# Patient Record
Sex: MALE | Race: OTHER | HISPANIC OR LATINO | URBAN - METROPOLITAN AREA
[De-identification: names, ages, dates, MRNs, and addresses within clinical notes are randomized per-mention and may not be internally consistent; named-entity substitution may affect disease eponyms.]

---

## 2022-06-18 ENCOUNTER — INPATIENT (INPATIENT)
Facility: HOSPITAL | Age: 32
LOS: 1 days | Discharge: HOME | DRG: 918 | End: 2022-06-20
Admitting: INTERNAL MEDICINE
Payer: COMMERCIAL

## 2022-06-18 VITALS
TEMPERATURE: 99 F | DIASTOLIC BLOOD PRESSURE: 85 MMHG | WEIGHT: 165.35 LBS | SYSTOLIC BLOOD PRESSURE: 133 MMHG | OXYGEN SATURATION: 96 % | HEART RATE: 108 BPM | RESPIRATION RATE: 15 BRPM | HEIGHT: 72 IN

## 2022-06-18 DIAGNOSIS — D64.9 ANEMIA, UNSPECIFIED: ICD-10-CM

## 2022-06-18 DIAGNOSIS — Z29.9 ENCOUNTER FOR PROPHYLACTIC MEASURES, UNSPECIFIED: ICD-10-CM

## 2022-06-18 DIAGNOSIS — R45.851 SUICIDAL IDEATIONS: ICD-10-CM

## 2022-06-18 DIAGNOSIS — T50.901A POISONING BY UNSPECIFIED DRUGS, MEDICAMENTS AND BIOLOGICAL SUBSTANCES, ACCIDENTAL (UNINTENTIONAL), INITIAL ENCOUNTER: ICD-10-CM

## 2022-06-18 DIAGNOSIS — F32.9 MAJOR DEPRESSIVE DISORDER, SINGLE EPISODE, UNSPECIFIED: ICD-10-CM

## 2022-06-18 LAB
ALBUMIN SERPL ELPH-MCNC: 4.4 G/DL — SIGNIFICANT CHANGE UP (ref 3.3–5)
ALBUMIN SERPL ELPH-MCNC: 4.5 G/DL — SIGNIFICANT CHANGE UP (ref 3.3–5)
ALP SERPL-CCNC: 55 U/L — SIGNIFICANT CHANGE UP (ref 40–120)
ALP SERPL-CCNC: 56 U/L — SIGNIFICANT CHANGE UP (ref 40–120)
ALT FLD-CCNC: 26 U/L — SIGNIFICANT CHANGE UP (ref 10–45)
ALT FLD-CCNC: 28 U/L — SIGNIFICANT CHANGE UP (ref 10–45)
ANION GAP SERPL CALC-SCNC: 12 MMOL/L — SIGNIFICANT CHANGE UP (ref 5–17)
ANION GAP SERPL CALC-SCNC: 14 MMOL/L — SIGNIFICANT CHANGE UP (ref 5–17)
APAP SERPL-MCNC: 10 UG/ML — SIGNIFICANT CHANGE UP (ref 10–30)
APTT BLD: 28.7 SEC — SIGNIFICANT CHANGE UP (ref 27.5–35.5)
AST SERPL-CCNC: 41 U/L — HIGH (ref 10–40)
AST SERPL-CCNC: 43 U/L — HIGH (ref 10–40)
BASE EXCESS BLDV CALC-SCNC: -1.1 MMOL/L — SIGNIFICANT CHANGE UP (ref -2–3)
BASOPHILS # BLD AUTO: 0.05 K/UL — SIGNIFICANT CHANGE UP (ref 0–0.2)
BASOPHILS NFR BLD AUTO: 0.7 % — SIGNIFICANT CHANGE UP (ref 0–2)
BILIRUB DIRECT SERPL-MCNC: 0.2 MG/DL — SIGNIFICANT CHANGE UP (ref 0–0.3)
BILIRUB INDIRECT FLD-MCNC: 0.3 MG/DL — SIGNIFICANT CHANGE UP (ref 0.2–1)
BILIRUB SERPL-MCNC: 0.5 MG/DL — SIGNIFICANT CHANGE UP (ref 0.2–1.2)
BILIRUB SERPL-MCNC: 0.7 MG/DL — SIGNIFICANT CHANGE UP (ref 0.2–1.2)
BUN SERPL-MCNC: 10 MG/DL — SIGNIFICANT CHANGE UP (ref 7–23)
BUN SERPL-MCNC: 12 MG/DL — SIGNIFICANT CHANGE UP (ref 7–23)
CA-I SERPL-SCNC: 1.23 MMOL/L — SIGNIFICANT CHANGE UP (ref 1.15–1.33)
CALCIUM SERPL-MCNC: 9 MG/DL — SIGNIFICANT CHANGE UP (ref 8.4–10.5)
CALCIUM SERPL-MCNC: 9.5 MG/DL — SIGNIFICANT CHANGE UP (ref 8.4–10.5)
CHLORIDE SERPL-SCNC: 106 MMOL/L — SIGNIFICANT CHANGE UP (ref 96–108)
CHLORIDE SERPL-SCNC: 108 MMOL/L — SIGNIFICANT CHANGE UP (ref 96–108)
CO2 BLDV-SCNC: 24.7 MMOL/L — SIGNIFICANT CHANGE UP (ref 22–26)
CO2 SERPL-SCNC: 21 MMOL/L — LOW (ref 22–31)
CO2 SERPL-SCNC: 21 MMOL/L — LOW (ref 22–31)
CREAT SERPL-MCNC: 0.72 MG/DL — SIGNIFICANT CHANGE UP (ref 0.5–1.3)
CREAT SERPL-MCNC: 0.79 MG/DL — SIGNIFICANT CHANGE UP (ref 0.5–1.3)
EGFR: 123 ML/MIN/1.73M2 — SIGNIFICANT CHANGE UP
EGFR: 126 ML/MIN/1.73M2 — SIGNIFICANT CHANGE UP
EOSINOPHIL # BLD AUTO: 0.18 K/UL — SIGNIFICANT CHANGE UP (ref 0–0.5)
EOSINOPHIL NFR BLD AUTO: 2.6 % — SIGNIFICANT CHANGE UP (ref 0–6)
ETHANOL SERPL-MCNC: <10 MG/DL — SIGNIFICANT CHANGE UP (ref 0–10)
GAS PNL BLDV: 139 MMOL/L — SIGNIFICANT CHANGE UP (ref 136–145)
GAS PNL BLDV: SIGNIFICANT CHANGE UP
GLUCOSE SERPL-MCNC: 102 MG/DL — HIGH (ref 70–99)
GLUCOSE SERPL-MCNC: 117 MG/DL — HIGH (ref 70–99)
HCO3 BLDV-SCNC: 24 MMOL/L — SIGNIFICANT CHANGE UP (ref 22–29)
HCT VFR BLD CALC: 36.7 % — LOW (ref 39–50)
HGB BLD-MCNC: 12.6 G/DL — LOW (ref 13–17)
IMM GRANULOCYTES NFR BLD AUTO: 0.3 % — SIGNIFICANT CHANGE UP (ref 0–1.5)
INR BLD: 0.98 — SIGNIFICANT CHANGE UP (ref 0.88–1.16)
LACTATE SERPL-SCNC: 0.7 MMOL/L — SIGNIFICANT CHANGE UP (ref 0.5–2)
LYMPHOCYTES # BLD AUTO: 1.73 K/UL — SIGNIFICANT CHANGE UP (ref 1–3.3)
LYMPHOCYTES # BLD AUTO: 25.1 % — SIGNIFICANT CHANGE UP (ref 13–44)
MAGNESIUM SERPL-MCNC: 2 MG/DL — SIGNIFICANT CHANGE UP (ref 1.6–2.6)
MCHC RBC-ENTMCNC: 33.1 PG — SIGNIFICANT CHANGE UP (ref 27–34)
MCHC RBC-ENTMCNC: 34.3 GM/DL — SIGNIFICANT CHANGE UP (ref 32–36)
MCV RBC AUTO: 96.3 FL — SIGNIFICANT CHANGE UP (ref 80–100)
MONOCYTES # BLD AUTO: 0.56 K/UL — SIGNIFICANT CHANGE UP (ref 0–0.9)
MONOCYTES NFR BLD AUTO: 8.1 % — SIGNIFICANT CHANGE UP (ref 2–14)
NEUTROPHILS # BLD AUTO: 4.35 K/UL — SIGNIFICANT CHANGE UP (ref 1.8–7.4)
NEUTROPHILS NFR BLD AUTO: 63.2 % — SIGNIFICANT CHANGE UP (ref 43–77)
NRBC # BLD: 0 /100 WBCS — SIGNIFICANT CHANGE UP (ref 0–0)
PCO2 BLDV: 38 MMHG — LOW (ref 42–55)
PH BLDV: 7.4 — SIGNIFICANT CHANGE UP (ref 7.32–7.43)
PLATELET # BLD AUTO: 278 K/UL — SIGNIFICANT CHANGE UP (ref 150–400)
PO2 BLDV: 91 MMHG — HIGH (ref 25–45)
POTASSIUM BLDV-SCNC: 3.5 MMOL/L — SIGNIFICANT CHANGE UP (ref 3.5–5.1)
POTASSIUM SERPL-MCNC: 3.6 MMOL/L — SIGNIFICANT CHANGE UP (ref 3.5–5.3)
POTASSIUM SERPL-MCNC: 4.2 MMOL/L — SIGNIFICANT CHANGE UP (ref 3.5–5.3)
POTASSIUM SERPL-SCNC: 3.6 MMOL/L — SIGNIFICANT CHANGE UP (ref 3.5–5.3)
POTASSIUM SERPL-SCNC: 4.2 MMOL/L — SIGNIFICANT CHANGE UP (ref 3.5–5.3)
PROT SERPL-MCNC: 6.9 G/DL — SIGNIFICANT CHANGE UP (ref 6–8.3)
PROT SERPL-MCNC: 7 G/DL — SIGNIFICANT CHANGE UP (ref 6–8.3)
PROTHROM AB SERPL-ACNC: 11.6 SEC — SIGNIFICANT CHANGE UP (ref 10.5–13.4)
RBC # BLD: 3.81 M/UL — LOW (ref 4.2–5.8)
RBC # FLD: 11.8 % — SIGNIFICANT CHANGE UP (ref 10.3–14.5)
SALICYLATES SERPL-MCNC: <0.3 MG/DL — LOW (ref 2.8–20)
SAO2 % BLDV: 97 % — HIGH (ref 67–88)
SARS-COV-2 RNA SPEC QL NAA+PROBE: NEGATIVE — SIGNIFICANT CHANGE UP
SODIUM SERPL-SCNC: 141 MMOL/L — SIGNIFICANT CHANGE UP (ref 135–145)
SODIUM SERPL-SCNC: 141 MMOL/L — SIGNIFICANT CHANGE UP (ref 135–145)
WBC # BLD: 6.89 K/UL — SIGNIFICANT CHANGE UP (ref 3.8–10.5)
WBC # FLD AUTO: 6.89 K/UL — SIGNIFICANT CHANGE UP (ref 3.8–10.5)

## 2022-06-18 PROCEDURE — 99291 CRITICAL CARE FIRST HOUR: CPT

## 2022-06-18 PROCEDURE — 93010 ELECTROCARDIOGRAM REPORT: CPT

## 2022-06-18 PROCEDURE — 71045 X-RAY EXAM CHEST 1 VIEW: CPT | Mod: 26

## 2022-06-18 PROCEDURE — 99223 1ST HOSP IP/OBS HIGH 75: CPT | Mod: GC

## 2022-06-18 RX ORDER — ONDANSETRON 8 MG/1
4 TABLET, FILM COATED ORAL ONCE
Refills: 0 | Status: COMPLETED | OUTPATIENT
Start: 2022-06-18 | End: 2022-06-18

## 2022-06-18 RX ORDER — SODIUM CHLORIDE 9 MG/ML
2000 INJECTION INTRAMUSCULAR; INTRAVENOUS; SUBCUTANEOUS ONCE
Refills: 0 | Status: COMPLETED | OUTPATIENT
Start: 2022-06-18 | End: 2022-06-18

## 2022-06-18 RX ORDER — ACTIVATED CHARCOAL 208 MG/ML
50 SUSPENSION ORAL ONCE
Refills: 0 | Status: COMPLETED | OUTPATIENT
Start: 2022-06-18 | End: 2022-06-18

## 2022-06-18 RX ORDER — POTASSIUM CHLORIDE 20 MEQ
40 PACKET (EA) ORAL ONCE
Refills: 0 | Status: COMPLETED | OUTPATIENT
Start: 2022-06-18 | End: 2022-06-19

## 2022-06-18 RX ADMIN — SODIUM CHLORIDE 4000 MILLILITER(S): 9 INJECTION INTRAMUSCULAR; INTRAVENOUS; SUBCUTANEOUS at 18:59

## 2022-06-18 RX ADMIN — ONDANSETRON 4 MILLIGRAM(S): 8 TABLET, FILM COATED ORAL at 19:30

## 2022-06-18 RX ADMIN — ACTIVATED CHARCOAL 50 GRAM(S): 208 SUSPENSION ORAL at 19:23

## 2022-06-18 NOTE — CONSULT NOTE ADULT - SUBJECTIVE AND OBJECTIVE BOX
ICU CONSULT NOTE    HPI:  31 yo PMHx of anxiety/depression presents to the ED after taking 10 tabs of sertaline 100 mg (old medication no longer actively takes) 10 tables of tyelnol 500 mg and half a bottle of wellbutrin 150 mg. Patient was recently discharged from [facility] earlier this week after ingesting alcohol, cocaine and crystal meth. Patient went home after hospitalization and took all thse medications in an attempt to end his life. Patient endorses current suicidal ideation. Denies any alcohol or other drug use today. He drinks every weekend, occasional use of crystal meth and cigarettes. No opoid use that he can recall.  Patient has no other medical history, but takes descovy at home for prep. Patient states that he does not have any psychiatric hospitalizations that he can recall. Initially had abdominal pain which now thus resolved.     In the ED:  Vitals: Temp 98.6, , /85, RR 16 O2 saturation 96   Labs: WBC 6.89, RBC 12.6, Plt 278SNa 141, K 3.6, Cl106, Bicarb 21, Anion gap 14, Cr 0.79, AST 43ABG VBG pH 7.4, Co2 38 O2 91 O2 saturation 97%  Imaging: CXR wnl  Intervention: activated charcoal, 2 L NS, zofran (18 Jun 2022 21:34)      REVIEW OF SYSTEMS:  ROS negative unless as stated above    PAST MEDICAL & SURGICAL HISTORY:  Anxiety and depression          FAMILY HISTORY:      SOCIAL HISTORY:  Smoking Status: [ ] Current, [ ] Former, [ ] Never  Pack Years:    MEDICATIONS:  Pulmonary:    Antimicrobials:    Anticoagulants:    Onc:    GI/:    Endocrine:    Cardiac:    Other Medications:  LORazepam   Injectable 2 milliGRAM(s) IV Push every 4 hours PRN      Allergies    Allergy Status Unknown    Intolerances        Vital Signs Last 24 Hrs  T(C): 37.2 (19 Jun 2022 06:26), Max: 37.2 (19 Jun 2022 06:26)  T(F): 99 (19 Jun 2022 06:26), Max: 99 (19 Jun 2022 06:26)  HR: 70 (19 Jun 2022 04:10) (70 - 108)  BP: 120/69 (19 Jun 2022 04:10) (120/69 - 153/93)  BP(mean): 88 (19 Jun 2022 04:10) (88 - 101)  RR: 16 (19 Jun 2022 04:10) (15 - 17)  SpO2: 93% (19 Jun 2022 04:10) (93% - 100%)    06-18 @ 07:01  -  06-19 @ 06:47  --------------------------------------------------------  IN: 0 mL / OUT: 600 mL / NET: -600 mL          PHYSICAL EXAM:  GENERAL: depressed affect, profusely sweating   HEAD:  Normocephalic, atraumatic  EYES: EOMI, PERRLA  HEENT: charcoal seen in mouth   NECK: Supple, No JVD  NERVOUS SYSTEM:  Alert & Oriented X3, Motor Strength 5/5 B/L upper and lower extremities  CHEST/LUNG: Clear to auscultation bilaterally  HEART: Regular rate and rhythm  ABDOMEN: Soft, non tender, Nondistended, Bowel sounds present  GENITOURINARY: Voiding, no palpable bladder  EXTREMITIES:   No clubbing, cyanosis, or edema  MUSCULOSKELETAL- No muscle tenderness, no joint tenderness  SKIN-no rash    LABS:      CBC Full  -  ( 19 Jun 2022 05:30 )  WBC Count : 5.50 K/uL  RBC Count : 3.95 M/uL  Hemoglobin : 12.9 g/dL  Hematocrit : 37.7 %  Platelet Count - Automated : 263 K/uL  Mean Cell Volume : 95.4 fl  Mean Cell Hemoglobin : 32.7 pg  Mean Cell Hemoglobin Concentration : 34.2 gm/dL  Auto Neutrophil # : 3.44 K/uL  Auto Lymphocyte # : 1.40 K/uL  Auto Monocyte # : 0.49 K/uL  Auto Eosinophil # : 0.11 K/uL  Auto Basophil # : 0.05 K/uL  Auto Neutrophil % : 62.5 %  Auto Lymphocyte % : 25.5 %  Auto Monocyte % : 8.9 %  Auto Eosinophil % : 2.0 %  Auto Basophil % : 0.9 %    06-19    135  |  103  |  7   ----------------------------<  108<H>  4.0   |  22  |  0.69    Ca    8.9      19 Jun 2022 05:30  Phos  3.2     06-19  Mg     2.0     06-19    TPro  6.8  /  Alb  4.4  /  TBili  0.6  /  DBili  x   /  AST  36  /  ALT  27  /  AlkPhos  53  06-19    PT/INR - ( 19 Jun 2022 05:30 )   PT: 12.3 sec;   INR: 1.03          PTT - ( 19 Jun 2022 05:30 )  PTT:28.1 sec                  RADIOLOGY & ADDITIONAL STUDIES:

## 2022-06-18 NOTE — H&P ADULT - NSHPPHYSICALEXAM_GEN_ALL_CORE
PHYSICAL EXAM:    GENERAL: depressed affect, profusely sweating   HEAD:  Normocephalic, atraumatic  EYES: EOMI, PERRLA  HEENT: charcoal seen in mouth   NECK: Supple, No JVD  NERVOUS SYSTEM:  Alert & Oriented X3, Motor Strength 5/5 B/L upper and lower extremities  CHEST/LUNG: Clear to auscultation bilaterally  HEART: Regular rate and rhythm  ABDOMEN: Soft, non tender, Nondistended, Bowel sounds present  GENITOURINARY: Voiding, no palpable bladder  EXTREMITIES:   No clubbing, cyanosis, or edema  MUSCULOSKELETAL- No muscle tenderness, no joint tenderness  SKIN-no rash

## 2022-06-18 NOTE — ED ADULT NURSE NOTE - OBJECTIVE STATEMENT
.  32years male near alert mental state (AOX3) received by EMS.  -complain of SI.  Hx of anxiety, depression, cocaine use. pt took Tylenol, Wellbutrin, and Sertraline around 6pm today. pt called  911. EMS was transporting him while they accessed IV on Lt arm and gave fluid.   -denied chest pain, SOB, abdomen pain.  Pt is in the bed comfortably at this time. Will continue to monitor and document any changes. .  32years male near alert mental state (AOX3) received by EMS.  -complain of SI.  Hx of anxiety, depression, cocaine use. pt took Tylenol, Wellbutrin, and Sertraline around 6pm today. pt called  911. EMS was transporting him while they accessed IV on Lt arm and gave fluid.    pt last drugs use and drinking alcohol were on last Wednesday. (3-4times drinking alcohol per week)   -denied chest pain, SOB, abdomen pain.  Pt is in the bed comfortably at this time. Will continue to monitor and document any changes.

## 2022-06-18 NOTE — ED ADULT TRIAGE NOTE - CHIEF COMPLAINT QUOTE
SI Attempt about 45 min ago. EMS reports pt took Tylenol, Wellbutrin, and Sertraline. Bottles found empty on scene. Pt called 911. Pt awake and alert upon arrival to [facility], becoming more lethargic. Hx of anxiety, depression, cocaine use as per EMS. 1:1 initiated, EKG in progress.

## 2022-06-18 NOTE — H&P ADULT - ATTENDING COMMENTS
OD on bupropion, sertraline and acetaminophen. Toxicology consulted.  tele monitoring, serial EKG to monitor QTc  agitation/tremor and possible seizure monitoring and treatment, ativan PRN on stand by  serial lfts and acetaminophen level, possible treatment with NAC OD on bupropion, sertraline and acetaminophen. Toxicology consulted.  tele monitoring, serial EKG to monitor QTc  agitation/tremor and possible seizure monitoring and treatment, ativan PRN on stand by  serial lfts and acetaminophen level, possible treatment with NAC  1:1 observation  psychiatry evaluation

## 2022-06-18 NOTE — H&P ADULT - PROBLEM SELECTOR PLAN 4
patient with hx of major depression  only on wellbutrin 100 at home  psych consulted to see pt in AM  -f/u psych recs

## 2022-06-18 NOTE — H&P ADULT - PROBLEM SELECTOR PLAN 2
patient with active   psych consulted  to see patient in the AM  continue with constant observation  -f/u psych recs

## 2022-06-18 NOTE — H&P ADULT - HISTORY OF PRESENT ILLNESS
31 yo PMHx of anxiety/depression presents to the ED after taking 10 tabs of sertaline 100 mg (old medication no longer actively takes) 10 tables of tyelnol 500 mg and half a bottle of wellbutrin 150 mg. Patient was recently discharged from [facility] earlier this week after ingesting alcohol, cocaine and crystal meth. Patient went home after hospitalization and took all thse medications in an attempt to end his life. Patient endorses current suicidal ideation. Denies any alcohol or other drug use today. He drinks every weekend, occasional use of crystal meth and cigarettes. No opoid use that he can recall.  Patient has no other medical history, but takes descovy at home for prep. Patient states that he does not have any psychiatric hospitalizations that he can recall. Initially had abdominal pain which now thus resolved.     In the ED:  Vitals: Temp 98.6, , /85, RR 16 O2 saturation 96   Labs: WBC 6.89, RBC 12.6, Plt 278SNa 141, K 3.6, Cl106, Bicarb 21, Anion gap 14, Cr 0.79, AST 43ABG VBG pH 7.4, Co2 38 O2 91 O2 saturation 97%  Imaging: CXR wnl  Intervention: activated charcoal, 2 L NS, zofran

## 2022-06-18 NOTE — H&P ADULT - PROBLEM SELECTOR PLAN 3
patient with anemia on labs at 12.7  normocytic anemia  no signs or symptoms of bleeding  -f/u AM iron studies b12 folate

## 2022-06-18 NOTE — CONSULT NOTE ADULT - ATTENDING COMMENTS
Admit to telemetry for monitoring of EKG changes/ rhythm/ seizures/mental status/ frequent blood draw.

## 2022-06-18 NOTE — ED PROVIDER NOTE - OBJECTIVE STATEMENT
33 yo PMHx of anxiety/depression presenting with SI. Hx limited by somnolence, per pt took sertraline 100mg ~10 tablets, tylenol 500mg ?tablets, bupropion XL 150mg tablets ~10 tablets 45mins prior to arrival. reports nausea and stomach cramping. Recent admission to psych [facility] per EMS, pt lives home alone. Denies etoh or other illicit drug use today. 33 yo PMHx of anxiety/depression presenting with SI. Hx limited by somnolence, per pt took sertraline 100mg ~10 tablets, tylenol 500mg ?tablets, bupropion XL 150mg tablets ~10 tablets 45mins prior to arrival. reports nausea and stomach cramping. Recent admission to [facility] per EMS, pt lives home alone. Denies etoh or other illicit drug use today. Denies falls, trauma, skin cutting.

## 2022-06-18 NOTE — ED PROVIDER NOTE - NS_EDPROVIDERDISPOUSERTYPE_ED_A_ED
I have personally performed a face to face diagnostic evaluation on this patient. I have reviewed the ACP note and agree with the history, exam and plan of care, except as noted. Attending Attestation (For Attendings USE Only)...

## 2022-06-18 NOTE — ED PROVIDER NOTE - PHYSICAL EXAMINATION
CONSTITUTIONAL: Awake, alert and in no apparent distress.  HEENT: Head is atraumatic. Eyes clear bilaterally, normal EOMI. Airway patent.  CARDIAC: Normal rate, regular rhythm.  Heart sounds S1, S2.   RESPIRATORY: Breath sounds clear and equal bilaterally. no tachypnea, respiratory distress.   GASTROINTESTINAL: Abdomen soft, non-tender, no guarding, distension.  MUSCULOSKELETAL: Spine appears normal, no midline spinal tenderness, range of motion is not limited, no muscle or joint tenderness. no bony tenderness.   NEUROLOGICAL: Alert, no focal deficits, no motor or sensory deficits.  SKIN: Skin normal color for race, warm, dry and intact. No evidence of rash.

## 2022-06-18 NOTE — H&P ADULT - PROBLEM SELECTOR PLAN 1
patient presents after 31 yo PMHx of anxiety/depression presents to the ED after taking 10 tabs of sertaline 100 mg (old medication no longer actively takes) 10 tables of tyelnol 500 mg and half a bottle of wellbutrin 150 mg admitted for further monitoring   admits to taking medication in a suicide attempt  poison control called in ED - recalled when reached 7la left message  given activated charcoal  admitted for qtc monitoring, level monitoring and seizure precaution  -q4 cmp salicyate level acetaminophen level ekgs  -ativan 2 mg prn for agitation/seizure (provider to be informed before being given)

## 2022-06-18 NOTE — H&P ADULT - ASSESSMENT
31 yo PMHx of anxiety/depression presents to the ED after taking 10 tabs of sertaline 100 mg (old medication no longer actively takes) 10 tables of tyelnol 500 mg and half a bottle of wellbutrin 150 mg admitted for further monitoring

## 2022-06-18 NOTE — CONSULT NOTE ADULT - ASSESSMENT
31 yo PMHx of anxiety/depression presents to the ED after taking 10 tabs of sertaline 100 mg (old medication no longer actively takes) 10 tables of tyelnol 500 mg and half a bottle of wellbutrin 150 mg admitted for further monitoring 33 yo PMHx of anxiety/depression presents to the ED after taking 10 tabs of sertaline 100 mg (old medication no longer actively takes) 10 tables of tyelnol 500 mg and half a bottle of wellbutrin 150 mg admitted for further monitoring.

## 2022-06-18 NOTE — ED PROVIDER NOTE - CLINICAL SUMMARY MEDICAL DECISION MAKING FREE TEXT BOX
Pt here for overdose as SI w above medications, given charcoal on arrival. Discussed with pharmacist Catrachito Washington, continue to monitor for seizures, serotonin syndrome, QRS widening.. Pt here for overdose as SI w above medications, given charcoal on arrival. Discussed with pharmacist Catrachito Washington of poison control, continue to monitor for seizures, serotonin syndrome, QRS widening, consider NAC after 4 hour acetaminophen level.

## 2022-06-18 NOTE — ED PROVIDER NOTE - INPATIENT RESIDENT/ACP NOTIFIED
Expected Date Of Service: 08/20/2021 Billing Type: Third-Party Bill ICU res Bill For Surgical Tray: no

## 2022-06-19 DIAGNOSIS — F14.20 COCAINE DEPENDENCE, UNCOMPLICATED: ICD-10-CM

## 2022-06-19 DIAGNOSIS — Z90.89 ACQUIRED ABSENCE OF OTHER ORGANS: Chronic | ICD-10-CM

## 2022-06-19 DIAGNOSIS — F19.10 OTHER PSYCHOACTIVE SUBSTANCE ABUSE, UNCOMPLICATED: ICD-10-CM

## 2022-06-19 DIAGNOSIS — F41.1 GENERALIZED ANXIETY DISORDER: ICD-10-CM

## 2022-06-19 DIAGNOSIS — F15.20 OTHER STIMULANT DEPENDENCE, UNCOMPLICATED: ICD-10-CM

## 2022-06-19 LAB
ALBUMIN SERPL ELPH-MCNC: 4.4 G/DL — SIGNIFICANT CHANGE UP (ref 3.3–5)
ALBUMIN SERPL ELPH-MCNC: 4.4 G/DL — SIGNIFICANT CHANGE UP (ref 3.3–5)
ALP SERPL-CCNC: 53 U/L — SIGNIFICANT CHANGE UP (ref 40–120)
ALP SERPL-CCNC: 56 U/L — SIGNIFICANT CHANGE UP (ref 40–120)
ALT FLD-CCNC: 27 U/L — SIGNIFICANT CHANGE UP (ref 10–45)
ALT FLD-CCNC: 27 U/L — SIGNIFICANT CHANGE UP (ref 10–45)
ANION GAP SERPL CALC-SCNC: 10 MMOL/L — SIGNIFICANT CHANGE UP (ref 5–17)
ANION GAP SERPL CALC-SCNC: 11 MMOL/L — SIGNIFICANT CHANGE UP (ref 5–17)
APAP SERPL-MCNC: <5 UG/ML — LOW (ref 10–30)
APAP SERPL-MCNC: <5 UG/ML — LOW (ref 10–30)
APTT BLD: 28.1 SEC — SIGNIFICANT CHANGE UP (ref 27.5–35.5)
AST SERPL-CCNC: 36 U/L — SIGNIFICANT CHANGE UP (ref 10–40)
AST SERPL-CCNC: 38 U/L — SIGNIFICANT CHANGE UP (ref 10–40)
BASOPHILS # BLD AUTO: 0.05 K/UL — SIGNIFICANT CHANGE UP (ref 0–0.2)
BASOPHILS NFR BLD AUTO: 0.9 % — SIGNIFICANT CHANGE UP (ref 0–2)
BILIRUB SERPL-MCNC: 0.6 MG/DL — SIGNIFICANT CHANGE UP (ref 0.2–1.2)
BILIRUB SERPL-MCNC: 0.7 MG/DL — SIGNIFICANT CHANGE UP (ref 0.2–1.2)
BUN SERPL-MCNC: 7 MG/DL — SIGNIFICANT CHANGE UP (ref 7–23)
BUN SERPL-MCNC: 8 MG/DL — SIGNIFICANT CHANGE UP (ref 7–23)
CALCIUM SERPL-MCNC: 8.9 MG/DL — SIGNIFICANT CHANGE UP (ref 8.4–10.5)
CALCIUM SERPL-MCNC: 8.9 MG/DL — SIGNIFICANT CHANGE UP (ref 8.4–10.5)
CHLORIDE SERPL-SCNC: 103 MMOL/L — SIGNIFICANT CHANGE UP (ref 96–108)
CHLORIDE SERPL-SCNC: 104 MMOL/L — SIGNIFICANT CHANGE UP (ref 96–108)
CO2 SERPL-SCNC: 20 MMOL/L — LOW (ref 22–31)
CO2 SERPL-SCNC: 22 MMOL/L — SIGNIFICANT CHANGE UP (ref 22–31)
CREAT SERPL-MCNC: 0.67 MG/DL — SIGNIFICANT CHANGE UP (ref 0.5–1.3)
CREAT SERPL-MCNC: 0.69 MG/DL — SIGNIFICANT CHANGE UP (ref 0.5–1.3)
EGFR: 128 ML/MIN/1.73M2 — SIGNIFICANT CHANGE UP
EGFR: 129 ML/MIN/1.73M2 — SIGNIFICANT CHANGE UP
EOSINOPHIL # BLD AUTO: 0.11 K/UL — SIGNIFICANT CHANGE UP (ref 0–0.5)
EOSINOPHIL NFR BLD AUTO: 2 % — SIGNIFICANT CHANGE UP (ref 0–6)
FERRITIN SERPL-MCNC: 108 NG/ML — SIGNIFICANT CHANGE UP (ref 30–400)
FOLATE SERPL-MCNC: >20 NG/ML — SIGNIFICANT CHANGE UP
GLUCOSE SERPL-MCNC: 108 MG/DL — HIGH (ref 70–99)
GLUCOSE SERPL-MCNC: 118 MG/DL — HIGH (ref 70–99)
HCT VFR BLD CALC: 37.7 % — LOW (ref 39–50)
HGB BLD-MCNC: 12.9 G/DL — LOW (ref 13–17)
IMM GRANULOCYTES NFR BLD AUTO: 0.2 % — SIGNIFICANT CHANGE UP (ref 0–1.5)
INR BLD: 1.03 — SIGNIFICANT CHANGE UP (ref 0.88–1.16)
IRON SATN MFR SERPL: 18 % — SIGNIFICANT CHANGE UP (ref 16–55)
IRON SATN MFR SERPL: 53 UG/DL — SIGNIFICANT CHANGE UP (ref 45–165)
LYMPHOCYTES # BLD AUTO: 1.4 K/UL — SIGNIFICANT CHANGE UP (ref 1–3.3)
LYMPHOCYTES # BLD AUTO: 25.5 % — SIGNIFICANT CHANGE UP (ref 13–44)
MAGNESIUM SERPL-MCNC: 2 MG/DL — SIGNIFICANT CHANGE UP (ref 1.6–2.6)
MCHC RBC-ENTMCNC: 32.7 PG — SIGNIFICANT CHANGE UP (ref 27–34)
MCHC RBC-ENTMCNC: 34.2 GM/DL — SIGNIFICANT CHANGE UP (ref 32–36)
MCV RBC AUTO: 95.4 FL — SIGNIFICANT CHANGE UP (ref 80–100)
MONOCYTES # BLD AUTO: 0.49 K/UL — SIGNIFICANT CHANGE UP (ref 0–0.9)
MONOCYTES NFR BLD AUTO: 8.9 % — SIGNIFICANT CHANGE UP (ref 2–14)
NEUTROPHILS # BLD AUTO: 3.44 K/UL — SIGNIFICANT CHANGE UP (ref 1.8–7.4)
NEUTROPHILS NFR BLD AUTO: 62.5 % — SIGNIFICANT CHANGE UP (ref 43–77)
NRBC # BLD: 0 /100 WBCS — SIGNIFICANT CHANGE UP (ref 0–0)
PHOSPHATE SERPL-MCNC: 3.2 MG/DL — SIGNIFICANT CHANGE UP (ref 2.5–4.5)
PLATELET # BLD AUTO: 263 K/UL — SIGNIFICANT CHANGE UP (ref 150–400)
POTASSIUM SERPL-MCNC: 4 MMOL/L — SIGNIFICANT CHANGE UP (ref 3.5–5.3)
POTASSIUM SERPL-MCNC: 4 MMOL/L — SIGNIFICANT CHANGE UP (ref 3.5–5.3)
POTASSIUM SERPL-SCNC: 4 MMOL/L — SIGNIFICANT CHANGE UP (ref 3.5–5.3)
POTASSIUM SERPL-SCNC: 4 MMOL/L — SIGNIFICANT CHANGE UP (ref 3.5–5.3)
PROT SERPL-MCNC: 6.8 G/DL — SIGNIFICANT CHANGE UP (ref 6–8.3)
PROT SERPL-MCNC: 7.1 G/DL — SIGNIFICANT CHANGE UP (ref 6–8.3)
PROTHROM AB SERPL-ACNC: 12.3 SEC — SIGNIFICANT CHANGE UP (ref 10.5–13.4)
RBC # BLD: 3.95 M/UL — LOW (ref 4.2–5.8)
RBC # FLD: 11.9 % — SIGNIFICANT CHANGE UP (ref 10.3–14.5)
SALICYLATES SERPL-MCNC: <0.3 MG/DL — LOW (ref 2.8–20)
SALICYLATES SERPL-MCNC: <0.3 MG/DL — LOW (ref 2.8–20)
SODIUM SERPL-SCNC: 135 MMOL/L — SIGNIFICANT CHANGE UP (ref 135–145)
SODIUM SERPL-SCNC: 135 MMOL/L — SIGNIFICANT CHANGE UP (ref 135–145)
TIBC SERPL-MCNC: 302 UG/DL — SIGNIFICANT CHANGE UP (ref 220–430)
TRANSFERRIN SERPL-MCNC: 251 MG/DL — SIGNIFICANT CHANGE UP (ref 200–360)
UIBC SERPL-MCNC: 249 UG/DL — SIGNIFICANT CHANGE UP (ref 110–370)
VIT B12 SERPL-MCNC: 530 PG/ML — SIGNIFICANT CHANGE UP (ref 232–1245)
WBC # BLD: 5.5 K/UL — SIGNIFICANT CHANGE UP (ref 3.8–10.5)
WBC # FLD AUTO: 5.5 K/UL — SIGNIFICANT CHANGE UP (ref 3.8–10.5)

## 2022-06-19 PROCEDURE — 99233 SBSQ HOSP IP/OBS HIGH 50: CPT | Mod: GC

## 2022-06-19 RX ORDER — IBUPROFEN 200 MG
200 TABLET ORAL ONCE
Refills: 0 | Status: COMPLETED | OUTPATIENT
Start: 2022-06-19 | End: 2022-06-19

## 2022-06-19 RX ADMIN — Medication 200 MILLIGRAM(S): at 18:23

## 2022-06-19 RX ADMIN — Medication 200 MILLIGRAM(S): at 20:02

## 2022-06-19 RX ADMIN — Medication 40 MILLIEQUIVALENT(S): at 00:20

## 2022-06-19 NOTE — BH CONSULTATION LIAISON ASSESSMENT NOTE - NSBHCONSULTMEDANXIETY_PSY_A_CORE FT
- can give hydroxyzine (Atarax) 25 mg PO TID PRN for anxiety; patient currently declining initiation of benzodiazepines for anxiety
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
## 2022-06-19 NOTE — BH CONSULTATION LIAISON ASSESSMENT NOTE - NSBHSAAMPHET_PSY_A_CORE FT
The patient reports methamphetamine use every weekend since Thanksgiving 2021.  Last use on 06/16/22

## 2022-06-19 NOTE — BH CONSULTATION LIAISON ASSESSMENT NOTE - RISK ASSESSMENT
The patient is at chronic elevated risk for self-harm/suicide given a history of one suicide attempt (June 2022), diagnoses of depression and anxiety, ongoing substance use (cocaine, methamphetamines, alcohol, cannabis), male gender, and history of trauma which is acutely elevated given acute substance use and suicide attempt via intentional overdose.  Protective factors that mitigate the patient's risk include a capacity to advocate for self, future-oriented, employment, cultural/spiritual beliefs against suicide, responsibility to family members, positive therapeutic relationship.
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
## 2022-06-19 NOTE — BH CONSULTATION LIAISON ASSESSMENT NOTE - CURRENT MEDICATION
MEDICATIONS  (STANDING):    MEDICATIONS  (PRN):  LORazepam   Injectable 2 milliGRAM(s) IV Push every 4 hours PRN Anxiety and agitation

## 2022-06-19 NOTE — PATIENT PROFILE ADULT - FALL HARM RISK - HARM RISK INTERVENTIONS

## 2022-06-19 NOTE — BH CONSULTATION LIAISON ASSESSMENT NOTE - NSBHSAALC_PSY_A_CORE FT
The patient reports consuming one bottle of Pastis every weekend as well as multiple drinks when out with friends

## 2022-06-19 NOTE — BH CONSULTATION LIAISON ASSESSMENT NOTE - NSICDXBHSECONDARYDX_PSY_ALL_CORE
Suicidal ideation   R45.851  Generalized anxiety disorder   F41.1  Polysubstance abuse   F19.10  Severe cocaine use disorder   F14.20  Methamphetamine use disorder, severe   F15.20

## 2022-06-19 NOTE — BH CONSULTATION LIAISON ASSESSMENT NOTE - NSUNABLEASSESSPROTRISKCOMMENT_PSY_ALL_CORE
Protective factors that mitigate the patient's risk include a capacity to advocate for self, future-oriented, employment, cultural/spiritual beliefs against suicide, responsibility to family members, positive therapeutic relationship.

## 2022-06-19 NOTE — BH CONSULTATION LIAISON ASSESSMENT NOTE - NSACTIVEVENT_PSY_ALL_CORE
Triggering events leading to humiliation, shame, and/or despair (e.g., Loss of relationship, financial or health status) (real or anticipated)/Current or pending social isolation/Substance intoxication or withdrawal/Inadequate social supports

## 2022-06-19 NOTE — BH CONSULTATION LIAISON ASSESSMENT NOTE - DESCRIPTION
The patient was born and raised in [place], [place] by his biological mother and father and older sister.  The patient's sister is 5 years his senior and currently resides in [place], [place].  The patient is domiciled in a private [facility] apartment.  The patient graduated from the [employer] in 2014.  He is currently an  for [employer].  He denies a history of  service.  He identifies as a cisgender male and is homosexual.  The patient states that he is half [place]-American.  He was raised Shinto but has been considering Atheism.

## 2022-06-19 NOTE — BH CONSULTATION LIAISON ASSESSMENT NOTE - NSCOMMENTSUICRISKFACT_PSY_ALL_CORE
The patient has a chronic elevated risk for self-harm/suicide given a history of one suicide attempt (June 2022), diagnoses of depression and anxiety, ongoing substance use (cocaine, methamphetamines, alcohol, cannabis), male gender, and history of trauma.

## 2022-06-19 NOTE — BH CONSULTATION LIAISON ASSESSMENT NOTE - NSBHSACONSEQUENCE_PSY_A_CORE FT
CPEP psychiatric evaluation.  Suicide attempt via intentional overdose leading to inpatient psychiatric hospitalization

## 2022-06-19 NOTE — BH CONSULTATION LIAISON ASSESSMENT NOTE - NSBHATTESTSEENBY_PSY_A_CORE
yes/need take antibiotic before dental procedure Trainee with telephonic supervision from Attending Psychiatrist

## 2022-06-19 NOTE — BH CONSULTATION LIAISON ASSESSMENT NOTE - NSBHCHARTREVIEWVS_PSY_A_CORE FT
Vital Signs Last 24 Hrs  T(C): 36.5 (19 Jun 2022 17:58), Max: 37.2 (19 Jun 2022 06:26)  T(F): 97.7 (19 Jun 2022 17:58), Max: 99 (19 Jun 2022 06:26)  HR: 88 (19 Jun 2022 16:20) (70 - 92)  BP: 138/74 (19 Jun 2022 16:20) (120/69 - 153/93)  BP(mean): 99 (19 Jun 2022 16:20) (88 - 101)  RR: 16 (19 Jun 2022 16:20) (16 - 17)  SpO2: 96% (19 Jun 2022 16:20) (93% - 100%)

## 2022-06-19 NOTE — BH CONSULTATION LIAISON ASSESSMENT NOTE - NSSUICPROTFACT_PSY_ALL_CORE
Responsibility to children, family, or others/Identifies reasons for living/Fear of death or the actual act of killing self/Cultural, spiritual and/or moral attitudes against suicide/Engaged in work or school/Positive therapeutic relationships/Rastafarian beliefs
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
## 2022-06-19 NOTE — PROGRESS NOTE ADULT - ATTENDING COMMENTS
Intentional drug overdose with suicidal ideation with h/o bipolar disorder. Monitoring on tele. Acetaminophen level 10 after 12 hour. No indication for NAC. Psych consult. Intentional drug overdose with suicidal ideation with h/o anxiety. Monitoring on tele. Acetaminophen level 10 after 12 hour. No indication for NAC. Psych consult.

## 2022-06-20 ENCOUNTER — INPATIENT (INPATIENT)
Facility: HOSPITAL | Age: 32
LOS: 6 days | Discharge: HOME | DRG: 918 | End: 2022-06-27
Attending: PSYCHIATRY & NEUROLOGY | Admitting: PSYCHIATRY & NEUROLOGY
Payer: COMMERCIAL

## 2022-06-20 ENCOUNTER — TRANSCRIPTION ENCOUNTER (OUTPATIENT)
Age: 32
End: 2022-06-20

## 2022-06-20 VITALS
RESPIRATION RATE: 17 BRPM | OXYGEN SATURATION: 97 % | TEMPERATURE: 98 F | HEART RATE: 85 BPM | SYSTOLIC BLOOD PRESSURE: 130 MMHG | DIASTOLIC BLOOD PRESSURE: 79 MMHG

## 2022-06-20 DIAGNOSIS — Z90.89 ACQUIRED ABSENCE OF OTHER ORGANS: Chronic | ICD-10-CM

## 2022-06-20 PROBLEM — F41.9 ANXIETY DISORDER, UNSPECIFIED: Chronic | Status: ACTIVE | Noted: 2022-06-18

## 2022-06-20 LAB
ALBUMIN SERPL ELPH-MCNC: 4.6 G/DL — SIGNIFICANT CHANGE UP (ref 3.3–5)
ALP SERPL-CCNC: 56 U/L — SIGNIFICANT CHANGE UP (ref 40–120)
ALT FLD-CCNC: 23 U/L — SIGNIFICANT CHANGE UP (ref 10–45)
ANION GAP SERPL CALC-SCNC: 11 MMOL/L — SIGNIFICANT CHANGE UP (ref 5–17)
ANISOCYTOSIS BLD QL: SLIGHT — SIGNIFICANT CHANGE UP
APAP SERPL-MCNC: <5 UG/ML — LOW (ref 10–30)
AST SERPL-CCNC: 25 U/L — SIGNIFICANT CHANGE UP (ref 10–40)
BASOPHILS # BLD AUTO: 0.05 K/UL — SIGNIFICANT CHANGE UP (ref 0–0.2)
BASOPHILS NFR BLD AUTO: 0.9 % — SIGNIFICANT CHANGE UP (ref 0–2)
BILIRUB SERPL-MCNC: 0.5 MG/DL — SIGNIFICANT CHANGE UP (ref 0.2–1.2)
BUN SERPL-MCNC: 7 MG/DL — SIGNIFICANT CHANGE UP (ref 7–23)
CALCIUM SERPL-MCNC: 9.5 MG/DL — SIGNIFICANT CHANGE UP (ref 8.4–10.5)
CHLORIDE SERPL-SCNC: 104 MMOL/L — SIGNIFICANT CHANGE UP (ref 96–108)
CO2 SERPL-SCNC: 23 MMOL/L — SIGNIFICANT CHANGE UP (ref 22–31)
CREAT SERPL-MCNC: 0.77 MG/DL — SIGNIFICANT CHANGE UP (ref 0.5–1.3)
EGFR: 124 ML/MIN/1.73M2 — SIGNIFICANT CHANGE UP
EOSINOPHIL # BLD AUTO: 0.23 K/UL — SIGNIFICANT CHANGE UP (ref 0–0.5)
EOSINOPHIL NFR BLD AUTO: 4.4 % — SIGNIFICANT CHANGE UP (ref 0–6)
GIANT PLATELETS BLD QL SMEAR: PRESENT — SIGNIFICANT CHANGE UP
GLUCOSE SERPL-MCNC: 100 MG/DL — HIGH (ref 70–99)
HCT VFR BLD CALC: 40.4 % — SIGNIFICANT CHANGE UP (ref 39–50)
HGB BLD-MCNC: 13.7 G/DL — SIGNIFICANT CHANGE UP (ref 13–17)
HYPOCHROMIA BLD QL: SLIGHT — SIGNIFICANT CHANGE UP
LYMPHOCYTES # BLD AUTO: 1.04 K/UL — SIGNIFICANT CHANGE UP (ref 1–3.3)
LYMPHOCYTES # BLD AUTO: 19.5 % — SIGNIFICANT CHANGE UP (ref 13–44)
MACROCYTES BLD QL: SLIGHT — SIGNIFICANT CHANGE UP
MAGNESIUM SERPL-MCNC: 2 MG/DL — SIGNIFICANT CHANGE UP (ref 1.6–2.6)
MANUAL SMEAR VERIFICATION: SIGNIFICANT CHANGE UP
MCHC RBC-ENTMCNC: 32.6 PG — SIGNIFICANT CHANGE UP (ref 27–34)
MCHC RBC-ENTMCNC: 33.9 GM/DL — SIGNIFICANT CHANGE UP (ref 32–36)
MCV RBC AUTO: 96.2 FL — SIGNIFICANT CHANGE UP (ref 80–100)
MONOCYTES # BLD AUTO: 0.1 K/UL — SIGNIFICANT CHANGE UP (ref 0–0.9)
MONOCYTES NFR BLD AUTO: 1.8 % — LOW (ref 2–14)
NEUTROPHILS # BLD AUTO: 3.86 K/UL — SIGNIFICANT CHANGE UP (ref 1.8–7.4)
NEUTROPHILS NFR BLD AUTO: 72.5 % — SIGNIFICANT CHANGE UP (ref 43–77)
OVALOCYTES BLD QL SMEAR: SLIGHT — SIGNIFICANT CHANGE UP
PHOSPHATE SERPL-MCNC: 2.5 MG/DL — SIGNIFICANT CHANGE UP (ref 2.5–4.5)
PLAT MORPH BLD: ABNORMAL
PLATELET # BLD AUTO: 271 K/UL — SIGNIFICANT CHANGE UP (ref 150–400)
POIKILOCYTOSIS BLD QL AUTO: SLIGHT — SIGNIFICANT CHANGE UP
POLYCHROMASIA BLD QL SMEAR: SLIGHT — SIGNIFICANT CHANGE UP
POTASSIUM SERPL-MCNC: 4.3 MMOL/L — SIGNIFICANT CHANGE UP (ref 3.5–5.3)
POTASSIUM SERPL-SCNC: 4.3 MMOL/L — SIGNIFICANT CHANGE UP (ref 3.5–5.3)
PROT SERPL-MCNC: 7.4 G/DL — SIGNIFICANT CHANGE UP (ref 6–8.3)
RBC # BLD: 4.2 M/UL — SIGNIFICANT CHANGE UP (ref 4.2–5.8)
RBC # FLD: 11.9 % — SIGNIFICANT CHANGE UP (ref 10.3–14.5)
RBC BLD AUTO: ABNORMAL
SALICYLATES SERPL-MCNC: <0.3 MG/DL — LOW (ref 2.8–20)
SMUDGE CELLS # BLD: PRESENT — SIGNIFICANT CHANGE UP
SODIUM SERPL-SCNC: 138 MMOL/L — SIGNIFICANT CHANGE UP (ref 135–145)
SPHEROCYTES BLD QL SMEAR: SLIGHT — SIGNIFICANT CHANGE UP
VARIANT LYMPHS # BLD: 0.9 % — SIGNIFICANT CHANGE UP (ref 0–6)
WBC # BLD: 5.33 K/UL — SIGNIFICANT CHANGE UP (ref 3.8–10.5)
WBC # FLD AUTO: 5.33 K/UL — SIGNIFICANT CHANGE UP (ref 3.8–10.5)

## 2022-06-20 PROCEDURE — 82607 VITAMIN B-12: CPT

## 2022-06-20 PROCEDURE — 85730 THROMBOPLASTIN TIME PARTIAL: CPT

## 2022-06-20 PROCEDURE — 99239 HOSP IP/OBS DSCHRG MGMT >30: CPT | Mod: GC

## 2022-06-20 PROCEDURE — 83605 ASSAY OF LACTIC ACID: CPT

## 2022-06-20 PROCEDURE — 82746 ASSAY OF FOLIC ACID SERUM: CPT

## 2022-06-20 PROCEDURE — 80307 DRUG TEST PRSMV CHEM ANLYZR: CPT

## 2022-06-20 PROCEDURE — 84295 ASSAY OF SERUM SODIUM: CPT

## 2022-06-20 PROCEDURE — 36415 COLL VENOUS BLD VENIPUNCTURE: CPT

## 2022-06-20 PROCEDURE — 82248 BILIRUBIN DIRECT: CPT

## 2022-06-20 PROCEDURE — 85610 PROTHROMBIN TIME: CPT

## 2022-06-20 PROCEDURE — 96374 THER/PROPH/DIAG INJ IV PUSH: CPT

## 2022-06-20 PROCEDURE — 80053 COMPREHEN METABOLIC PANEL: CPT

## 2022-06-20 PROCEDURE — 84132 ASSAY OF SERUM POTASSIUM: CPT

## 2022-06-20 PROCEDURE — 99232 SBSQ HOSP IP/OBS MODERATE 35: CPT | Mod: GC

## 2022-06-20 PROCEDURE — 82330 ASSAY OF CALCIUM: CPT

## 2022-06-20 PROCEDURE — 87635 SARS-COV-2 COVID-19 AMP PRB: CPT

## 2022-06-20 PROCEDURE — 83540 ASSAY OF IRON: CPT

## 2022-06-20 PROCEDURE — 84466 ASSAY OF TRANSFERRIN: CPT

## 2022-06-20 PROCEDURE — 84100 ASSAY OF PHOSPHORUS: CPT

## 2022-06-20 PROCEDURE — 83550 IRON BINDING TEST: CPT

## 2022-06-20 PROCEDURE — 99291 CRITICAL CARE FIRST HOUR: CPT | Mod: 25

## 2022-06-20 PROCEDURE — 82728 ASSAY OF FERRITIN: CPT

## 2022-06-20 PROCEDURE — 83735 ASSAY OF MAGNESIUM: CPT

## 2022-06-20 PROCEDURE — 99233 SBSQ HOSP IP/OBS HIGH 50: CPT

## 2022-06-20 PROCEDURE — 71045 X-RAY EXAM CHEST 1 VIEW: CPT

## 2022-06-20 PROCEDURE — 85025 COMPLETE CBC W/AUTO DIFF WBC: CPT

## 2022-06-20 PROCEDURE — 82803 BLOOD GASES ANY COMBINATION: CPT

## 2022-06-20 RX ORDER — IBUPROFEN 200 MG
400 TABLET ORAL EVERY 4 HOURS
Refills: 0 | Status: DISCONTINUED | OUTPATIENT
Start: 2022-06-20 | End: 2022-06-27

## 2022-06-20 RX ORDER — LANOLIN ALCOHOL/MO/W.PET/CERES
5 CREAM (GRAM) TOPICAL AT BEDTIME
Refills: 0 | Status: DISCONTINUED | OUTPATIENT
Start: 2022-06-20 | End: 2022-06-27

## 2022-06-20 RX ORDER — HALOPERIDOL DECANOATE 100 MG/ML
5 INJECTION INTRAMUSCULAR EVERY 4 HOURS
Refills: 0 | Status: DISCONTINUED | OUTPATIENT
Start: 2022-06-20 | End: 2022-06-27

## 2022-06-20 RX ORDER — BUPROPION HYDROCHLORIDE 150 MG/1
1 TABLET, EXTENDED RELEASE ORAL
Qty: 0 | Refills: 0 | DISCHARGE

## 2022-06-20 RX ORDER — MAGNESIUM HYDROXIDE 400 MG/1
30 TABLET, CHEWABLE ORAL DAILY
Refills: 0 | Status: DISCONTINUED | OUTPATIENT
Start: 2022-06-20 | End: 2022-06-27

## 2022-06-20 NOTE — DISCHARGE NOTE PROVIDER - NSDCFUADDAPPT_GEN_ALL_CORE_FT
(1) Please follow up with [facility] by calling [phone] to schedule an appointment when you have received your Insurance information. You can schedule  following your discharge from the hospital.    You can also call [facility] at [phone] to schedule an appointment with their Primary Care Clinic directly following your hospital discharge.    Appointment was facilitated by Ms. VINCE Burktet, Referral Coordinator.

## 2022-06-20 NOTE — BH CONSULTATION LIAISON PROGRESS NOTE - NSBHCHARTREVIEWVS_PSY_A_CORE FT
Vital Signs Last 24 Hrs  T(C): 36.8 (20 Jun 2022 13:00), Max: 37 (19 Jun 2022 21:59)  T(F): 98.3 (20 Jun 2022 13:00), Max: 98.6 (19 Jun 2022 21:59)  HR: 85 (20 Jun 2022 13:00) (60 - 94)  BP: 130/79 (20 Jun 2022 13:00) (124/77 - 139/78)  BP(mean): 100 (20 Jun 2022 10:10) (93 - 100)  RR: 17 (20 Jun 2022 13:00) (16 - 17)   SpO2: 97% (20 Jun 2022 13:00) (96% - 97%)

## 2022-06-20 NOTE — DISCHARGE NOTE PROVIDER - NSDCCPCAREPLAN_GEN_ALL_CORE_FT
PRINCIPAL DISCHARGE DIAGNOSIS  Diagnosis: Medication overdose  Assessment and Plan of Treatment: An overdose is when you take a toxic (poisonous) amount of a drug or medicine. You stated that this was done intentionally due to suicidal thoguhts. These medications taken in excess can be life threatening and fatal. It is very important that you only take your medications as prescribed by your physician. If you have suicidal thoughts or thoughts of hurting yourself, immediately call 911 or go to the nearest emergency room.  -You are being discharged to an inpatient psych unit  -stop taking your wellbutrin until your psychiatrist tells you to restart  -follow up with outpatient psych

## 2022-06-20 NOTE — DISCHARGE NOTE NURSING/CASE MANAGEMENT/SOCIAL WORK - NSDCPEFALRISK_GEN_ALL_CORE
For information on Fall & Injury Prevention, visit: [email or web address] OR  [email or web address] OR  https://www.cdc.gov/steadi/patient.html

## 2022-06-20 NOTE — DISCHARGE NOTE PROVIDER - HOSPITAL COURSE
#Discharge: do not delete    31 yo PMHx of anxiety/depression presents to the ED after taking 10 tabs of sertaline 100 mg (old medication no longer actively takes) 10 tables of tyelnol 500 mg and half a bottle of wellbutrin 150 mg in an intentional drug overdose    Problem List/Main Diagnoses (system-based):     #Drug overdose.   Patient intentionally overdosed with 10 tables of tyelnol 500 mg and half a bottle of wellbutrin 150 mg admitted for further monitoring. Received activated charcoal. Poison control notified, recommended admission with qtc monitoring.   - qtc monitored, wnl, last EKG 6/19   - Tylenol level and salicylate levels minimal  - hemodynamically stable    #Suicidal ideation.   Patient presented with intentional overdose. Psychiatry consulted, recommended inpatient admission to psychiatry unit  - was on constant observation while on medicine service  - admit to [facility]    #Major depression.   Patient with hx of major depression, on Wellbutrin 150 ER qd at home  - admit to inpatient psychiatry unit  - hold wellbutrin for minimum 48 hours to allow for washout after overdose    Inpatient treatment course:   New medications: stop wellbutrin

## 2022-06-20 NOTE — PROGRESS NOTE ADULT - PROBLEM SELECTOR PLAN 3
patient with anemia on labs at 12.7. normocytic anemia. no signs or symptoms of bleeding.  - Normal B12 and folate
patient with anemia on labs at 12.7  normocytic anemia  no signs or symptoms of bleeding  -f/u iron studies b12 folate

## 2022-06-20 NOTE — DISCHARGE NOTE NURSING/CASE MANAGEMENT/SOCIAL WORK - NSDCPEWEB_GEN_ALL_CORE
[facility] for Tobacco Control website --- [email or web address] website --- [email or web address]

## 2022-06-20 NOTE — PROGRESS NOTE ADULT - ASSESSMENT
31 yo PMHx of anxiety/depression presents to the ED after taking 10 tabs of sertaline 100 mg (old medication no longer actively takes) 10 tables of tyelnol 500 mg and half a bottle of wellbutrin 150 mg admitted for further monitoring.
31 yo PMHx of anxiety/depression presents to the ED after taking 10 tabs of sertaline 100 mg (old medication no longer actively takes) 10 tables of tyelnol 500 mg and half a bottle of wellbutrin 150 mg admitted for further monitoring

## 2022-06-20 NOTE — BH PATIENT PROFILE - NSFLUVACAGEDISCH_IMM_ALL_CORE
Dupixent Pregnancy And Lactation Text: This medication likely crosses the placenta but the risk for the fetus is uncertain. This medication is excreted in breast milk. Adult

## 2022-06-20 NOTE — BH CONSULTATION LIAISON PROGRESS NOTE - NSBHPTASSESSDT_PSY_A_CORE
Spoke with patient and informed her of result note and recommendations from MD. She verbalized understanding. Patient did state she is very surprised at the results as she does not eat a lot of carbs or sugars and sticks to a high protein diet. She is agreeable to see diabetic education. Referral placed. Patient transferred to schedule. Script sent and future lab ordered. 20-Jun-2022 14:29

## 2022-06-20 NOTE — DISCHARGE NOTE NURSING/CASE MANAGEMENT/SOCIAL WORK - PATIENT PORTAL LINK FT
You can access the FollowMyHealth Patient Portal offered by [facility] by registering at the following website: [email or web address]. By joining [facility]’s FollowMyHealth portal, you will also be able to view your health information using other applications (apps) compatible with our system.

## 2022-06-20 NOTE — DISCHARGE NOTE PROVIDER - CARE PROVIDER_API CALL
Wayne Riggins)  Internal Medicine  [address] [place] [address]  Phone: [phone]  Fax: [phone]  Follow Up Time:

## 2022-06-20 NOTE — BH PATIENT PROFILE - FALL HARM RISK - HARM RISK INTERVENTIONS

## 2022-06-20 NOTE — DISCHARGE NOTE PROVIDER - ATTENDING DISCHARGE PHYSICAL EXAMINATION:
VITALS  Vital Signs Last 24 Hrs  T(C): 36.8 (20 Jun 2022 13:00), Max: 37 (19 Jun 2022 21:59)  T(F): 98.3 (20 Jun 2022 13:00), Max: 98.6 (19 Jun 2022 21:59)  HR: 85 (20 Jun 2022 13:00) (60 - 94)  BP: 130/79 (20 Jun 2022 13:00) (124/77 - 139/78)  BP(mean): 100 (20 Jun 2022 10:10) (93 - 100)  RR: 17 (20 Jun 2022 13:00) (16 - 17)  SpO2: 97% (20 Jun 2022 13:00) (96% - 97%)    I&O's Summary    CAPILLARY BLOOD GLUCOSE    PHYSICAL EXAM  General: A&Ox3; NAD  Head: NC/AT; PERRL; EOMI; anicteric sclera  Neck: Supple; no JVD  Respiratory: CTA B/L; no wheezes/crackles/rales auscultated w/ good air movement  Cardiovascular: Regular rhythm/rate; S1/S2; no gallops or murmurs auscultated  Gastrointestinal: Soft; NTND w/out rebound tenderness or guarding; bowel sounds normal  Extremities: WWP; no edema or cyanosis; radial/pedal pulses palpable  Neurological:  CNII-XII grossly intact; no obvious focal deficits  Skin: No rashes noted  Vasc: +2 DP/PT pulses b/l     Patient admitted following overdose, monitored on telemetry. Cleared for discharge to inpatient psych for further treatment of depression.

## 2022-06-20 NOTE — DISCHARGE NOTE NURSING/CASE MANAGEMENT/SOCIAL WORK - NSDCPEEMAIL_GEN_ALL_CORE
[facility] for Tobacco Control email [email or web address]

## 2022-06-20 NOTE — PROGRESS NOTE ADULT - PROBLEM SELECTOR PLAN 2
Psych consulted  - C/w with constant observation  - F/u psych recs in terms of need for constant obs and eventual psych placement
patient with active   psych consulted  to see patient in the AM  continue with constant observation  -f/u psych recs

## 2022-06-20 NOTE — BH CONSULTATION LIAISON PROGRESS NOTE - NSICDXBHSECONDARYDX_PSY_ALL_CORE
Drug overdose   T50.901A  Suicidal ideation   R45.851  Prophylactic measure   Z29.9  Anemia   D64.9  Major depression   F32.9  Generalized anxiety disorder   F41.1  Polysubstance abuse   F19.10  Severe cocaine use disorder   F14.20  Methamphetamine use disorder, severe   F15.20

## 2022-06-20 NOTE — DISCHARGE NOTE NURSING/CASE MANAGEMENT/SOCIAL WORK - NSDCFUADDAPPT_GEN_ALL_CORE_FT
(1) Please follow up with [facility] by calling [phone] to schedule an appointment when you have received your Insurance information. You can schedule  following your discharge from the hospital.    You can also call [facility] at [phone] to schedule an appointment with their Primary Care Clinic directly following your hospital discharge.    Appointment was facilitated by Ms. VINCE Burkett, Referral Coordinator.

## 2022-06-20 NOTE — BH CONSULTATION LIAISON PROGRESS NOTE - NSBHFUPINTERVALHXFT_PSY_A_CORE
Chart reviewed, case d/w primary team on 7L, also with team on 7U s/p transfer to SDU.  Writer met with pt on several occasions today.  Pt alert ,attentive, reports feeling distraught s/p relapse on cocaine and crystal meth, was sobbing for hrs and was taken to [facility], discharged though felt intense PI that he would be arrested, ruining his life.  He felt suicidal and overdosed on tylenol, zoloft, and wellbutrin in attempt to end his life.  He had residual paranoia thereafter.  Pt currently is feeling more hopeful, denies SI, does not feel intense PI that he will be arrested yet has some underlying anxiety.  He has some motivation for rehab care.  Discussed psychiatric admission, he has no hx admission or SA, is agreeable that this is necessary step toward stabilization.  Denies AVH.  AAOx4, can spell WORLD forwards and backwards.
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
## 2022-06-20 NOTE — PROGRESS NOTE ADULT - PROBLEM SELECTOR PLAN 1
patient presents after 33 yo PMHx of anxiety/depression presents to the ED after taking 10 tabs of sertaline 100 mg (old medication no longer actively takes) 10 tables of tyelnol 500 mg and half a bottle of wellbutrin 150 mg admitted for further monitoring. admits to taking medication in a suicide attempt. Given activated charcoal. Poison control notified, recommended admission with qtc monitoring, level monitoring and seizure precaution.  - Tylenol level and salicylate levels minimal  - Ativan 2 mg prn for agitation/seizure (provider to be informed before being given), not given
patient presents after 31 yo PMHx of anxiety/depression presents to the ED after taking 10 tabs of sertaline 100 mg (old medication no longer actively takes) 10 tables of tyelnol 500 mg and half a bottle of wellbutrin 150 mg admitted for further monitoring   admits to taking medication in a suicide attempt  poison control called in ED - recalled when reached 7la left message  given activated charcoal  admitted for qtc monitoring, level monitoring and seizure precaution  -q4 cmp salicyate level acetaminophen level ekgs  -ativan 2 mg prn for agitation/seizure (provider to be informed before being given)

## 2022-06-20 NOTE — PROGRESS NOTE ADULT - SUBJECTIVE AND OBJECTIVE BOX
33 yo PMHx of anxiety/depression presents to the ED 45 minutes after ingesting 10 tabs of sertaline 100 mg (old medication no longer actively takes), 10 tablets of tyelnol 500 mg, and half a bottle of wellbutrin 150 mg in a suicide attempt. Patient complaining of some nausea, abdominal cramping, and anxiety.Of note, patient was recently discharged from [facility] earlier this week after ingesting alcohol, cocaine, and crystal meth. Patient endorses current suicidal ideation. He drinks every weekend, occasional use of crystal meth and cigarettes. No opoid use that he can recall. Patient states that he does not have any psychiatric hospitalizations that he can recall. Patient takes Bupropion 150 ER qd and is MSM on PrEP with descovy.  Treated with activated charcoal in the ED. Admitted to [facility] for monitoring for 24 hours for overdose monitoring. Patient has shown no signs of tylenol toxicity, serotonin syndrome, or seizures. Spoke to family who would prefer to take patient home their house in [place]. Psych following, will consider [facility] and other inpatient psych facilities as well. Stable for stepdown to [facility].    OVERNIGHT EVENTS: No acute events overnight    SUBJECTIVE / INTERVAL HPI: Patient seen and examined at bedside. No n/v/d/c, CP, SOB. Some mild anxiety that is continuing to improve.    VITAL SIGNS:  Vital Signs Last 24 Hrs  T(C): 36.8 (20 Jun 2022 06:24), Max: 37 (19 Jun 2022 21:59)  T(F): 98.2 (20 Jun 2022 06:24), Max: 98.6 (19 Jun 2022 21:59)  HR: 94 (20 Jun 2022 10:10) (60 - 94)  BP: 139/78 (20 Jun 2022 10:10) (124/77 - 139/78)  BP(mean): 100 (20 Jun 2022 10:10) (93 - 100)  RR: 16 (20 Jun 2022 10:10) (16 - 17)  SpO2: 97% (20 Jun 2022 10:10) (96% - 97%)  I&O's Summary      PHYSICAL EXAM:    General: appears mild anxious  HEENT: NC/AT; PERRL, anicteric sclera; MMM  Neck: supple  Cardiovascular: +S1/S2; RRR  Respiratory: CTA B/L; no W/R/R  Gastrointestinal: soft, NT/ND; +BSx4  Extremities: WWP; no edema, clubbing or cyanosis  Vascular: 2+ radial, DP/PT pulses B/L  Neurological: AAOx3; no focal deficits    MEDICATIONS:  MEDICATIONS  (STANDING):    MEDICATIONS  (PRN):  LORazepam   Injectable 2 milliGRAM(s) IV Push every 4 hours PRN Anxiety and agitation      ALLERGIES:  Allergies    Allergy Status Unknown    Intolerances        LABS:                        13.7   5.33  )-----------( 271      ( 20 Jun 2022 05:30 )             40.4     06-20    138  |  104  |  7   ----------------------------<  100<H>  4.3   |  23  |  0.77    Ca    9.5      20 Jun 2022 05:30  Phos  2.5     06-20  Mg     2.0     06-20    TPro  7.4  /  Alb  4.6  /  TBili  0.5  /  DBili  x   /  AST  25  /  ALT  23  /  AlkPhos  56  06-20    PT/INR - ( 19 Jun 2022 05:30 )   PT: 12.3 sec;   INR: 1.03          PTT - ( 19 Jun 2022 05:30 )  PTT:28.1 sec    CAPILLARY BLOOD GLUCOSE          RADIOLOGY & ADDITIONAL TESTS: Reviewed.  
O/N Events: suicide attempt    Subjective/ROS: Patient seen and examined at bedside. wants to see a therapist/counselor     Denies Fever/Chills, HA, CP, SOB, n/v, changes in bowel/urinary habits.  12pt ROS otherwise negative.    VITALS  Vital Signs Last 24 Hrs  T(C): 36.5 (19 Jun 2022 17:58), Max: 37.2 (19 Jun 2022 06:26)  T(F): 97.7 (19 Jun 2022 17:58), Max: 99 (19 Jun 2022 06:26)  HR: 88 (19 Jun 2022 16:20) (70 - 92)  BP: 138/74 (19 Jun 2022 16:20) (120/69 - 153/93)  BP(mean): 99 (19 Jun 2022 16:20) (88 - 101)  RR: 16 (19 Jun 2022 16:20) (16 - 16)  SpO2: 96% (19 Jun 2022 16:20) (93% - 98%)    CAPILLARY BLOOD GLUCOSE          PHYSICAL EXAM  General: NAD  Head: NC/AT; MMM; PERRL; EOMI;  Neck: Supple; no JVD  Respiratory: CTAB; no wheezes/rales/rhonchi  Cardiovascular: Regular rhythm/rate; S1/S2+, no murmurs, rubs gallops   Gastrointestinal: Soft; NTND; bowel sounds normal and present  Extremities: WWP; no edema/cyanosis  Neurological: A&Ox3, CNII-XII grossly intact; no obvious focal deficits    MEDICATIONS  (STANDING):    MEDICATIONS  (PRN):  LORazepam   Injectable 2 milliGRAM(s) IV Push every 4 hours PRN Anxiety and agitation      Allergy Status Unknown      LABS                        12.9   5.50  )-----------( 263      ( 19 Jun 2022 05:30 )             37.7     06-19    135  |  103  |  7   ----------------------------<  108<H>  4.0   |  22  |  0.69    Ca    8.9      19 Jun 2022 05:30  Phos  3.2     06-19  Mg     2.0     06-19    TPro  6.8  /  Alb  4.4  /  TBili  0.6  /  DBili  x   /  AST  36  /  ALT  27  /  AlkPhos  53  06-19    PT/INR - ( 19 Jun 2022 05:30 )   PT: 12.3 sec;   INR: 1.03          PTT - ( 19 Jun 2022 05:30 )  PTT:28.1 sec            IMAGING/EKG/ETC

## 2022-06-20 NOTE — PROGRESS NOTE ADULT - PROBLEM SELECTOR PLAN 5
F: none  E: replete as needed  N: regular diet  DVT ppx: improved score 0
F: none  E: replete as needed  N: regular diet  DVT ppx: improved score 0

## 2022-06-20 NOTE — BH CONSULTATION LIAISON PROGRESS NOTE - OTHER
intermittent suicidal thoughts, no intent/plan, denies currently denies AVH, not internally preoccupied

## 2022-06-20 NOTE — BH CONSULTATION LIAISON PROGRESS NOTE - NSBHCHARTREVIEWLAB_PSY_A_CORE FT
CBC Full  -  ( 20 Jun 2022 05:30 )  WBC Count : 5.33 K/uL  RBC Count : 4.20 M/uL  Hemoglobin : 13.7 g/dL  Hematocrit : 40.4 %  Platelet Count - Automated : 271 K/uL  Mean Cell Volume : 96.2 fl  Mean Cell Hemoglobin : 32.6 pg  Mean Cell Hemoglobin Concentration : 33.9 gm/dL  Auto Neutrophil # : 3.86 K/uL  Auto Lymphocyte # : 1.04 K/uL  Auto Monocyte # : 0.10 K/uL  Auto Eosinophil # : 0.23 K/uL  Auto Basophil # : 0.05 K/uL  Auto Neutrophil % : 72.5 %  Auto Lymphocyte % : 19.5 %  Auto Monocyte % : 1.8 %  Auto Eosinophil % : 4.4 %  Auto Basophil % : 0.9 %  06-20    138  |  104  |  7   ----------------------------<  100<H>  4.3   |  23  |  0.77    Ca    9.5      20 Jun 2022 05:30  Phos  2.5     06-20  Mg     2.0     06-20    TPro  7.4  /  Alb  4.6  /  TBili  0.5  /  DBili  x   /  AST  25  /  ALT  23  /  AlkPhos  56  06-20

## 2022-06-21 VITALS
DIASTOLIC BLOOD PRESSURE: 70 MMHG | SYSTOLIC BLOOD PRESSURE: 115 MMHG | HEART RATE: 68 BPM | RESPIRATION RATE: 18 BRPM | OXYGEN SATURATION: 97 % | TEMPERATURE: 98 F

## 2022-06-21 DIAGNOSIS — F41.1 GENERALIZED ANXIETY DISORDER: ICD-10-CM

## 2022-06-21 DIAGNOSIS — F32.2 MAJOR DEPRESSIVE DISORDER, SINGLE EPISODE, SEVERE WITHOUT PSYCHOTIC FEATURES: ICD-10-CM

## 2022-06-21 DIAGNOSIS — F14.20 COCAINE DEPENDENCE, UNCOMPLICATED: ICD-10-CM

## 2022-06-21 DIAGNOSIS — F19.20 OTHER PSYCHOACTIVE SUBSTANCE DEPENDENCE, UNCOMPLICATED: ICD-10-CM

## 2022-06-21 DIAGNOSIS — F15.20 OTHER STIMULANT DEPENDENCE, UNCOMPLICATED: ICD-10-CM

## 2022-06-21 PROCEDURE — 99223 1ST HOSP IP/OBS HIGH 75: CPT

## 2022-06-21 NOTE — BH TREATMENT PLAN - NSTXSUICIDINTERRN_PSY_ALL_CORE
Establish therapeutic relationship with patient. Discuss current life situation and impact of substance use. Encourage pt to attend groups. Encourage pt to adhere to medication regimen.

## 2022-06-21 NOTE — BH INPATIENT PSYCHIATRY ASSESSMENT NOTE - CURRENT MEDICATION
MEDICATIONS  (STANDING):    MEDICATIONS  (PRN):  aluminum hydroxide/magnesium hydroxide/simethicone Suspension 30 milliLiter(s) Oral every 4 hours PRN Dyspepsia  haloperidol     Tablet 5 milliGRAM(s) Oral every 4 hours PRN agitation  ibuprofen  Tablet. 400 milliGRAM(s) Oral every 4 hours PRN Moderate Pain (4 - 6)  LORazepam     Tablet 2 milliGRAM(s) Oral every 4 hours PRN Agitation  magnesium hydroxide Suspension 30 milliLiter(s) Oral daily PRN Constipation  melatonin 5 milliGRAM(s) Oral at bedtime PRN Sleep

## 2022-06-21 NOTE — BH SOCIAL WORK INITIAL PSYCHOSOCIAL EVALUATION - NSBHHOUSESPA_PSY_ALL_CORE
[facility] Department of Anesthesiology  Pre-Anesthesia Evaluation/Consultation       Name:  Loletta Lesch  : 1933  Age:  82 y.o. MRN:  [id number]  Date: 2018           Procedure (Scheduled):  Phaco IOL Left eye  Surgeon:  Dr. Lisbet Morton     Allergies   Allergen Reactions    Ciprofloxacin Itching    Clindamycin Nausea Only    Clindamycin/Lincomycin     Orange Oil      Orange Juice and Lemonade caused cystitis      Patient Active Problem List   Diagnosis    Hyperlipidemia    Essential hypertension    Nevus    Fracture of multiple ribs    URI (upper respiratory infection)    Skin lesion    Cataracts, both eyes    Slow transit constipation    Paroxysmal atrial fibrillation (HCC)    Fall    Conductive hearing loss of both ears    Colitis    Hemorrhagic colitis    Bleeding per rectum    Age-related incipient cataract of both eyes     Past Medical History:   Diagnosis Date    Fluttering heart     Hyperlipidemia     Hypertension      Past Surgical History:   Procedure Laterality Date    BREAST BIOPSY Left     40 yrs ago milk gland    SIGMOIDOSCOPY      TONSILLECTOMY       Social History   Substance Use Topics    Smoking status: Former Smoker     Quit date:     Smokeless tobacco: Never Used    Alcohol use No     Medications  Current Outpatient Prescriptions on File Prior to [employer]   Medication Sig Dispense Refill    metoprolol succinate (TOPROL XL) 50 MG extended release tablet TAKE ONE TABLET BY MOUTH ONCE DAILY 90 tablet 2    simvastatin (ZOCOR) 40 MG tablet TAKE ONE TABLET BY MOUTH ONCE DAILY 90 tablet 2    aspirin 325 MG tablet Take 325 mg by mouth daily       No current facility-administered medications on file prior to encounter.       Current Outpatient Prescriptions   Medication Sig Dispense Refill    metoprolol succinate (TOPROL XL) 50 MG extended release tablet TAKE ONE TABLET BY MOUTH ONCE DAILY 90 tablet 2    encounter: 5' 7.5\" (1.715 m). Weight as of this encounter: 198 lb (89.8 kg). CBC   Lab Results   Component Value Date    WBC 8.9 11/08/2013    RBC 4.64 11/08/2013    HGB 13.9 11/08/2013    HCT 42.1 11/08/2013    MCV 90.7 11/08/2013    RDW 12.3 11/08/2013     11/08/2013     CMP    Lab Results   Component Value Date     12/04/2017    K 4.3 12/04/2017     12/04/2017    CO2 25 12/04/2017    BUN 14 12/04/2017    CREATININE 0.7 12/04/2017    GFRAA >60 12/04/2017    GFRAA >60 12/11/2012    AGRATIO 1.6 11/08/2013    LABGLOM >60 12/04/2017    LABGLOM 61.0 05/31/2011    GLUCOSE 101 12/04/2017    GLUCOSE 107 05/31/2011    PROT 7.6 11/08/2013    CALCIUM 9.6 12/04/2017    BILITOT 0.70 11/08/2013    ALKPHOS 115 11/08/2013    AST 22 12/04/2017    ALT 22 12/04/2017     BMP    Lab Results   Component Value Date     12/04/2017    K 4.3 12/04/2017     12/04/2017    CO2 25 12/04/2017    BUN 14 12/04/2017    CREATININE 0.7 12/04/2017    CALCIUM 9.6 12/04/2017    GFRAA >60 12/04/2017    GFRAA >60 12/11/2012    LABGLOM >60 12/04/2017    LABGLOM 61.0 05/31/2011    GLUCOSE 101 12/04/2017    GLUCOSE 107 05/31/2011     POCGlucose  No results for input(s): GLUCOSE in the last 72 hours.    Coags  No results found for: PROTIME, INR, APTT  HCG (If Applicable) No results found for: PREGTESTUR, PREGSERUM, HCG, HCGQUANT   ABGs No results found for: PHART, PO2ART, QCF2MUM, HMB8JLN, BEART, Y7FDWEFP   Type & Screen (If Applicable)  No results found for: LABABO, LABRH                         BMI: Wt Readings from Last 3 Encounters:       NPO Status:   Date of last liquid consumption: 02/01/18   Time of last liquid consumption: 2100   Date of last solid food consumption: 02/01/18      Time of last solid consumption: 2100       Anesthesia Evaluation  Patient summary reviewed no history of anesthetic complications:   Airway: Mallampati: III  TM distance: >3 FB   Neck ROM: full   Dental:    (+) partials No

## 2022-06-21 NOTE — BH SOCIAL WORK INITIAL PSYCHOSOCIAL EVALUATION - NSCMSPTSTRENGTHS_PSY_ALL_CORE
Compliance to treatment/Expressive of emotions/Financial stability/Future/goal oriented/Highly motivated for treatment/Intact employment/Intact family/Motivated/Positive attitude/Supportive family

## 2022-06-21 NOTE — BH SOCIAL WORK INITIAL PSYCHOSOCIAL EVALUATION - DETAILS
Maternal aunt has alcohol abuse.  Patient reports mom has depression and anxiety and maternal aunt has hx of alcohol abuse.

## 2022-06-21 NOTE — BH INPATIENT PSYCHIATRY ASSESSMENT NOTE - NSBHCHARTREVIEWVS_PSY_A_CORE FT
Vital Signs Last 24 Hrs  T(C): 36.9 (06-21-22 @ 09:00), Max: 36.9 (06-21-22 @ 09:00)  T(F): 98.4 (06-21-22 @ 09:00), Max: 98.4 (06-21-22 @ 09:00)  HR: 97 (06-21-22 @ 09:00) (68 - 97)  BP: 120/84 (06-21-22 @ 09:00) (115/70 - 120/84)  BP(mean): --  RR: 18 (06-21-22 @ 09:00) (18 - 18)  SpO2: 96% (06-21-22 @ 09:00) (96% - 97%)

## 2022-06-21 NOTE — BH SOCIAL WORK INITIAL PSYCHOSOCIAL EVALUATION - NSBHSASOBERLONGFT_PSY_ALL_CORE
Per chart review, pt reports periods of abstinence from substances with 3-4 relapses since 11/2022 (longest period being three weeks back in January 2022) and that he remained abstinent from substances for fifteen days prior to his intentional overdose. The patient states that, during this most recent period of abstinence, he was hopeful, future-oriented, with good mood and without depressive symptoms.
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
## 2022-06-21 NOTE — BH INPATIENT PSYCHIATRY ASSESSMENT NOTE - NSSUICPROTFACT_PSY_ALL_CORE
Responsibility to children, family, or others/Identifies reasons for living/Fear of death or the actual act of killing self/Cultural, spiritual and/or moral attitudes against suicide/Engaged in work or school/Positive therapeutic relationships/Shinto beliefs

## 2022-06-21 NOTE — BH SOCIAL WORK INITIAL PSYCHOSOCIAL EVALUATION - NSBHSAALC_PSY_A_CORE FT
Pt reports consuming one bottle of Pastis every weekend as well as multiple drinks when out with friends.

## 2022-06-21 NOTE — BH INPATIENT PSYCHIATRY ASSESSMENT NOTE - DESCRIPTION
The patient was born and raised in [place], [place] by his biological mother and father and older sister.  The patient's sister is 5 years his senior and currently resides in [place], [place].  The patient is domiciled in a private [facility] apartment.  The patient graduated from the [employer] in 2014.  He is currently an  for [employer].  He denies a history of  service.  He identifies as a cisgender male and is homosexual.  The patient states that he is half [place]-American.  He was raised Latter day but has been considering Atheism.

## 2022-06-21 NOTE — BH INPATIENT PSYCHIATRY ASSESSMENT NOTE - NSBHMETABOLIC_PSY_ALL_CORE_FT
BMI: BMI (kg/m2): 22.4 (06-18-22 @ 18:35)  HbA1c:   Glucose:   BP: 120/84 (06-21-22 @ 09:00) (115/70 - 120/84)  Lipid Panel:
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
## 2022-06-21 NOTE — BH SOCIAL WORK INITIAL PSYCHOSOCIAL EVALUATION - NSPTSTATEDGOAL_PSY_ALL_CORE
"I want to have a plan, I want to get better." Patient discussed goals to move back to [place] where he has more support, seeking MOREL tx, and remaining sober.

## 2022-06-21 NOTE — BH TREATMENT PLAN - NSTXDEPRESINTERMD_PSY_ALL_CORE
psychopharm x15 minutes, at this time will continue to observe and monitor prior to initiation of psychotropics

## 2022-06-21 NOTE — BH INPATIENT PSYCHIATRY ASSESSMENT NOTE - NSBHATTESTAPPBILLTIME_PSY_A_CORE
I attest my time as GULSHAN is greater than 50% of the total combined time spent on qualifying patient care activities. I have reviewed and verified the documentation.
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
## 2022-06-21 NOTE — BH SOCIAL WORK INITIAL PSYCHOSOCIAL EVALUATION - NSBHSAAMPHET_PSY_A_CORE FT
Pt reports using crystal meth every weekend. Pt reports first use in 11/2022 and last use on 6/16. Pt reports starting IV use in 02/22.

## 2022-06-21 NOTE — BH TREATMENT PLAN - NSTXSUBMISINTERMD_PSY_ALL_CORE
psychopharm x15 minutes, at this time will continue to observe and monitor prior to initiation of psychotropics psychopharm x15 minutes, at this time will continue to observe and monitor prior to initiation of psychotropics, consideration of [facility] program

## 2022-06-22 LAB
A1C WITH ESTIMATED AVERAGE GLUCOSE RESULT: 4.3 % — SIGNIFICANT CHANGE UP (ref 4–5.6)
CHOLEST SERPL-MCNC: 155 MG/DL — SIGNIFICANT CHANGE UP
DRUG SCREEN, SERUM: SIGNIFICANT CHANGE UP
ESTIMATED AVERAGE GLUCOSE: 77 MG/DL — SIGNIFICANT CHANGE UP (ref 68–114)
HDLC SERPL-MCNC: 41 MG/DL — SIGNIFICANT CHANGE UP
LIPID PNL WITH DIRECT LDL SERPL: 89 MG/DL — SIGNIFICANT CHANGE UP
NON HDL CHOLESTEROL: 114 MG/DL — SIGNIFICANT CHANGE UP
TRIGL SERPL-MCNC: 125 MG/DL — SIGNIFICANT CHANGE UP

## 2022-06-22 PROCEDURE — 99233 SBSQ HOSP IP/OBS HIGH 50: CPT

## 2022-06-22 PROCEDURE — 90791 PSYCH DIAGNOSTIC EVALUATION: CPT
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
## 2022-06-22 NOTE — BH SAFETY PLAN - WARNING SIGN 1
Pt completed safety plan and a copy can be found in his chart and another copy was given to the patient.

## 2022-06-22 NOTE — BH INPATIENT PSYCHIATRY PROGRESS NOTE - PRN MEDS
MEDICATIONS  (PRN):  aluminum hydroxide/magnesium hydroxide/simethicone Suspension 30 milliLiter(s) Oral every 4 hours PRN Dyspepsia  haloperidol     Tablet 5 milliGRAM(s) Oral every 4 hours PRN agitation  ibuprofen  Tablet. 400 milliGRAM(s) Oral every 4 hours PRN Moderate Pain (4 - 6)  LORazepam     Tablet 2 milliGRAM(s) Oral every 4 hours PRN Agitation  magnesium hydroxide Suspension 30 milliLiter(s) Oral daily PRN Constipation  melatonin 5 milliGRAM(s) Oral at bedtime PRN Sleep

## 2022-06-22 NOTE — BH PSYCHOLOGY ASSESSMENT - NSBHPSYCHOLASBEHAV_PSY_A_CORE FT
APPEARANCE:well groomed    EYE CONTACT: good    MOVEMENT: no abnormalities noted    SPEECH: wnl    THOUGHT PROCESSING: goal oriented    ORIENTATION:x3     THOUGHT CONTENT: guilt about substance use    MOOD: dysphoric    SUICIDAL/ HOMICIDAL IDEATION: denied    AFFECT: constricted    COOPERATION: fully cooperative

## 2022-06-22 NOTE — BH INPATIENT PSYCHIATRY PROGRESS NOTE - NSBHMETABOLIC_PSY_ALL_CORE_FT
BMI: BMI (kg/m2): 22.4 (06-18-22 @ 18:35)  HbA1c: A1C with Estimated Average Glucose Result: 4.3 % (06-22-22 @ 08:32)    Glucose:   BP: 128/81 (06-22-22 @ 16:49) (115/70 - 128/81)  Lipid Panel: Date/Time: 06-22-22 @ 08:32  Cholesterol, Serum: 155  Direct LDL: --  HDL Cholesterol, Serum: 41  Total Cholesterol/HDL Ration Measurement: --  Triglycerides, Serum: 125

## 2022-06-22 NOTE — BH INPATIENT PSYCHIATRY PROGRESS NOTE - NSBHCHARTREVIEWVS_PSY_A_CORE FT
Vital Signs Last 24 Hrs  T(C): 36.6 (06-22-22 @ 16:49), Max: 37.1 (06-22-22 @ 06:00)  T(F): 97.9 (06-22-22 @ 16:49), Max: 98.7 (06-22-22 @ 06:00)  HR: 73 (06-22-22 @ 16:49) (73 - 89)  BP: 128/81 (06-22-22 @ 16:49) (117/75 - 128/81)  BP(mean): --  RR: 18 (06-22-22 @ 16:49) (18 - 18)  SpO2: 98% (06-22-22 @ 16:49) (98% - 99%)

## 2022-06-23 LAB
HBV CORE AB SER-ACNC: SIGNIFICANT CHANGE UP
HBV SURFACE AB SER-ACNC: REACTIVE
HBV SURFACE AG SER-ACNC: SIGNIFICANT CHANGE UP
HCV AB S/CO SERPL IA: 0.04 S/CO — SIGNIFICANT CHANGE UP
HCV AB SERPL-IMP: SIGNIFICANT CHANGE UP
HIV 1+2 AB+HIV1 P24 AG SERPL QL IA: SIGNIFICANT CHANGE UP

## 2022-06-23 PROCEDURE — 99233 SBSQ HOSP IP/OBS HIGH 50: CPT

## 2022-06-23 RX ADMIN — Medication 400 MILLIGRAM(S): at 22:10

## 2022-06-23 RX ADMIN — Medication 400 MILLIGRAM(S): at 13:41

## 2022-06-23 RX ADMIN — Medication 400 MILLIGRAM(S): at 21:17

## 2022-06-23 NOTE — BH INPATIENT PSYCHIATRY PROGRESS NOTE - NSBHCHARTREVIEWVS_PSY_A_CORE FT
Vital Signs Last 24 Hrs  T(C): 36.9 (06-23-22 @ 16:37), Max: 36.9 (06-23-22 @ 16:37)  T(F): 98.4 (06-23-22 @ 16:37), Max: 98.4 (06-23-22 @ 16:37)  HR: 93 (06-23-22 @ 16:37) (76 - 93)  BP: 123/80 (06-23-22 @ 16:37) (108/79 - 123/80)  BP(mean): --  RR: 18 (06-23-22 @ 16:37) (18 - 18)  SpO2: 96% (06-23-22 @ 16:37) (96% - 98%)

## 2022-06-23 NOTE — BH INPATIENT PSYCHIATRY PROGRESS NOTE - NSBHMETABOLIC_PSY_ALL_CORE_FT
BMI: BMI (kg/m2): 22.4 (06-18-22 @ 18:35)  HbA1c: A1C with Estimated Average Glucose Result: 4.3 % (06-22-22 @ 08:32)    Glucose:   BP: 123/80 (06-23-22 @ 16:37) (108/79 - 128/81)  Lipid Panel: Date/Time: 06-22-22 @ 08:32  Cholesterol, Serum: 155  Direct LDL: --  HDL Cholesterol, Serum: 41  Total Cholesterol/HDL Ration Measurement: --  Triglycerides, Serum: 125

## 2022-06-24 DIAGNOSIS — R45.851 SUICIDAL IDEATIONS: ICD-10-CM
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
## 2022-06-24 DIAGNOSIS — T43.222A POISONING BY SELECTIVE SEROTONIN REUPTAKE INHIBITORS, INTENTIONAL SELF-HARM, INITIAL ENCOUNTER: ICD-10-CM

## 2022-06-24 DIAGNOSIS — T39.1X2A POISONING BY 4-AMINOPHENOL DERIVATIVES, INTENTIONAL SELF-HARM, INITIAL ENCOUNTER: ICD-10-CM

## 2022-06-24 DIAGNOSIS — D64.9 ANEMIA, UNSPECIFIED: ICD-10-CM

## 2022-06-24 DIAGNOSIS — T43.292A POISONING BY OTHER ANTIDEPRESSANTS, INTENTIONAL SELF-HARM, INITIAL ENCOUNTER: ICD-10-CM

## 2022-06-24 DIAGNOSIS — F33.9 MAJOR DEPRESSIVE DISORDER, RECURRENT, UNSPECIFIED: ICD-10-CM

## 2022-06-24 LAB
HAV IGG SER QL IA: SIGNIFICANT CHANGE UP
SARS-COV-2 RNA SPEC QL NAA+PROBE: SIGNIFICANT CHANGE UP
T PALLIDUM AB TITR SER: NEGATIVE — SIGNIFICANT CHANGE UP

## 2022-06-24 PROCEDURE — 99233 SBSQ HOSP IP/OBS HIGH 50: CPT

## 2022-06-24 RX ORDER — BUPROPION HYDROCHLORIDE 150 MG/1
150 TABLET, EXTENDED RELEASE ORAL DAILY
Refills: 0 | Status: DISCONTINUED | OUTPATIENT
Start: 2022-06-24 | End: 2022-06-27

## 2022-06-24 RX ADMIN — Medication 1 TABLET(S): at 17:28

## 2022-06-24 RX ADMIN — BUPROPION HYDROCHLORIDE 150 MILLIGRAM(S): 150 TABLET, EXTENDED RELEASE ORAL at 15:30

## 2022-06-24 NOTE — BH INPATIENT PSYCHIATRY PROGRESS NOTE - CURRENT MEDICATION
MEDICATIONS  (STANDING):  buPROPion XL (24-Hour) 150 milliGRAM(s) Oral daily  multivitamin 1 Tablet(s) Oral daily    MEDICATIONS  (PRN):  aluminum hydroxide/magnesium hydroxide/simethicone Suspension 30 milliLiter(s) Oral every 4 hours PRN Dyspepsia  haloperidol     Tablet 5 milliGRAM(s) Oral every 4 hours PRN agitation  ibuprofen  Tablet. 400 milliGRAM(s) Oral every 4 hours PRN Moderate Pain (4 - 6)  LORazepam     Tablet 2 milliGRAM(s) Oral every 4 hours PRN Agitation  magnesium hydroxide Suspension 30 milliLiter(s) Oral daily PRN Constipation  melatonin 5 milliGRAM(s) Oral at bedtime PRN Sleep

## 2022-06-24 NOTE — BH INPATIENT PSYCHIATRY PROGRESS NOTE - NSBHATTESTAPPBILLTIME_PSY_A_CORE
I attest my time as GULSHAN is greater than 50% of the total combined time spent on qualifying patient care activities. I have reviewed and verified the documentation.
I attest my time as GULSHAN is greater than 50% of the total combined time spent on qualifying patient care activities. I have reviewed and verified the documentation.

## 2022-06-24 NOTE — BH INPATIENT PSYCHIATRY PROGRESS NOTE - NSBHMETABOLIC_PSY_ALL_CORE_FT
BMI: BMI (kg/m2): 22.4 (06-18-22 @ 18:35)  HbA1c: A1C with Estimated Average Glucose Result: 4.3 % (06-22-22 @ 08:32)    Glucose:   BP: 115/73 (06-24-22 @ 09:00) (95/58 - 128/81)  Lipid Panel: Date/Time: 06-22-22 @ 08:32  Cholesterol, Serum: 155  Direct LDL: --  HDL Cholesterol, Serum: 41  Total Cholesterol/HDL Ration Measurement: --  Triglycerides, Serum: 125

## 2022-06-24 NOTE — BH INPATIENT PSYCHIATRY PROGRESS NOTE - NSBHCHARTREVIEWVS_PSY_A_CORE FT
Vital Signs Last 24 Hrs  T(C): 37.1 (06-24-22 @ 09:00), Max: 37.1 (06-24-22 @ 09:00)  T(F): 98.8 (06-24-22 @ 09:00), Max: 98.8 (06-24-22 @ 09:00)  HR: 88 (06-24-22 @ 09:00) (73 - 93)  BP: 115/73 (06-24-22 @ 09:00) (95/58 - 123/80)  BP(mean): --  RR: 18 (06-23-22 @ 20:19) (18 - 18)  SpO2: 98% (06-24-22 @ 09:00) (96% - 98%)

## 2022-06-25 RX ADMIN — BUPROPION HYDROCHLORIDE 150 MILLIGRAM(S): 150 TABLET, EXTENDED RELEASE ORAL at 09:36

## 2022-06-25 RX ADMIN — Medication 400 MILLIGRAM(S): at 20:41

## 2022-06-25 RX ADMIN — Medication 400 MILLIGRAM(S): at 19:46

## 2022-06-25 RX ADMIN — Medication 1 TABLET(S): at 09:36

## 2022-06-26 PROCEDURE — 99232 SBSQ HOSP IP/OBS MODERATE 35: CPT

## 2022-06-26 RX ADMIN — BUPROPION HYDROCHLORIDE 150 MILLIGRAM(S): 150 TABLET, EXTENDED RELEASE ORAL at 10:09

## 2022-06-26 RX ADMIN — Medication 1 TABLET(S): at 10:09

## 2022-06-26 NOTE — BH INPATIENT PSYCHIATRY PROGRESS NOTE - PRN MEDS
MEDICATIONS  (PRN):  aluminum hydroxide/magnesium hydroxide/simethicone Suspension 30 milliLiter(s) Oral every 4 hours PRN Dyspepsia  haloperidol     Tablet 5 milliGRAM(s) Oral every 4 hours PRN agitation  ibuprofen  Tablet. 400 milliGRAM(s) Oral every 4 hours PRN Moderate Pain (4 - 6)  LORazepam     Tablet 2 milliGRAM(s) Oral every 4 hours PRN Agitation  magnesium hydroxide Suspension 30 milliLiter(s) Oral daily PRN Constipation  melatonin 5 milliGRAM(s) Oral at bedtime PRN Sleep   MEDICATIONS  (PRN):  aluminum hydroxide/magnesium hydroxide/simethicone Suspension 30 milliLiter(s) Oral every 4 hours PRN Dyspepsia  haloperidol     Tablet 5 milliGRAM(s) Oral every 4 hours PRN agitation  ibuprofen  Tablet. 400 milliGRAM(s) Oral every 4 hours PRN Moderate Pain (4 - 6)  LORazepam     Tablet 2 milliGRAM(s) Oral every 8 hours PRN Agitation  magnesium hydroxide Suspension 30 milliLiter(s) Oral daily PRN Constipation  melatonin 5 milliGRAM(s) Oral at bedtime PRN Sleep

## 2022-06-26 NOTE — BH INPATIENT PSYCHIATRY PROGRESS NOTE - NSTXSUICIDGOALOTHER_PSY_ALL_CORE
Patient will stabilize mood and alleviate sx of SI and depression to engage in discharge planning.
Patient will stablize mood and alleviate sx of depression and SI to engage in discharge planning.
Patient will stabilize mood and alleviate sx of SI and depression to engage in discharge planning.
Patient will stabilize mood and alleviate sx of SI and depression to engage in discharge planning.

## 2022-06-26 NOTE — BH INPATIENT PSYCHIATRY PROGRESS NOTE - NSBHATTESTBILLINGAW_PSY_A_CORE
[id number] Inpatient care - high complexity - 35 minutes
[id number] Inpatient care - high complexity - 35 minutes
[id number] Inpatient care - moderate complexity - 25 minutes

## 2022-06-26 NOTE — BH INPATIENT PSYCHIATRY PROGRESS NOTE - NSTXSUBMISINTERMD_PSY_ALL_CORE
psychopharm x15 minutes, at this time will continue to observe and monitor prior to initiation of psychotropics, consideration of [facility] program
psychopharm x15 minutes, at this time will continue to observe and monitor prior to initiation of psychotropics
psychopharm x15 minutes, at this time will continue to observe and monitor prior to initiation of psychotropics, consideration of [facility] program
psychopharm x15 minutes, at this time will continue to observe and monitor prior to initiation of psychotropics, consideration of [facility] program

## 2022-06-26 NOTE — BH INPATIENT PSYCHIATRY PROGRESS NOTE - NSICDXBHSECONDARYDX_PSY_ALL_CORE
Suicidal ideation   R45.851  Generalized anxiety disorder   F41.1  Polysubstance abuse   F19.10  Severe cocaine use disorder   F14.20  Methamphetamine use disorder, severe   F15.20  
Polysubstance dependence   F19.20  Generalized anxiety disorder   F41.1  Severe cocaine use disorder   F14.20  Methamphetamine use disorder, severe   F15.20  Severe major depression   F32.2  

## 2022-06-26 NOTE — BH INPATIENT PSYCHIATRY PROGRESS NOTE - NSDCCRITERIA_PSY_ALL_CORE
Patient will report improvement in mood and sxs

## 2022-06-26 NOTE — BH INPATIENT PSYCHIATRY PROGRESS NOTE - NSTXDEPRESGOALOTHER_PSY_ALL_CORE
Patient will stabilize mood and alleviate sx of SI and depression to engage in discharge planning.

## 2022-06-26 NOTE — BH INPATIENT PSYCHIATRY PROGRESS NOTE - NSBHMETABOLIC_PSY_ALL_CORE_FT
BMI: BMI (kg/m2): 22.4 (06-18-22 @ 18:35)  HbA1c: A1C with Estimated Average Glucose Result: 4.3 % (06-22-22 @ 08:32)    Glucose:   BP: 113/74 (06-26-22 @ 06:00) (95/58 - 123/82)  Lipid Panel: Date/Time: 06-22-22 @ 08:32  Cholesterol, Serum: 155  Direct LDL: --  HDL Cholesterol, Serum: 41  Total Cholesterol/HDL Ration Measurement: --  Triglycerides, Serum: 125   BMI: BMI (kg/m2): 22.4 (06-18-22 @ 18:35)  HbA1c: A1C with Estimated Average Glucose Result: 4.3 % (06-22-22 @ 08:32)    Glucose:   BP: 133/88 (06-26-22 @ 09:00) (95/58 - 133/88)  Lipid Panel: Date/Time: 06-22-22 @ 08:32  Cholesterol, Serum: 155  Direct LDL: --  HDL Cholesterol, Serum: 41  Total Cholesterol/HDL Ration Measurement: --  Triglycerides, Serum: 125

## 2022-06-26 NOTE — BH INPATIENT PSYCHIATRY PROGRESS NOTE - NSICDXBHPRIMARYDX_PSY_ALL_CORE
Polysubstance dependence   F19.20  

## 2022-06-26 NOTE — BH INPATIENT PSYCHIATRY PROGRESS NOTE - NSTXSUBMISGOALOTHER_PSY_ALL_CORE
Pt will attend 12 step program
Pt. will attend 12-step meetings when speaker is on the unit
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
## 2022-06-26 NOTE — BH INPATIENT PSYCHIATRY PROGRESS NOTE - NSBHFUPINTERVALCCFT_PSY_A_CORE
stabilization of mood s/p SA
'I'm trying to let control go'
stabilization of mood s/p SA
stabilization of mood s/p SA
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
## 2022-06-26 NOTE — BH INPATIENT PSYCHIATRY PROGRESS NOTE - CURRENT MEDICATION
MEDICATIONS  (STANDING):  buPROPion XL (24-Hour) 150 milliGRAM(s) Oral daily  multivitamin 1 Tablet(s) Oral daily    MEDICATIONS  (PRN):  aluminum hydroxide/magnesium hydroxide/simethicone Suspension 30 milliLiter(s) Oral every 4 hours PRN Dyspepsia  haloperidol     Tablet 5 milliGRAM(s) Oral every 4 hours PRN agitation  ibuprofen  Tablet. 400 milliGRAM(s) Oral every 4 hours PRN Moderate Pain (4 - 6)  LORazepam     Tablet 2 milliGRAM(s) Oral every 4 hours PRN Agitation  magnesium hydroxide Suspension 30 milliLiter(s) Oral daily PRN Constipation  melatonin 5 milliGRAM(s) Oral at bedtime PRN Sleep   MEDICATIONS  (STANDING):  buPROPion XL (24-Hour) 150 milliGRAM(s) Oral daily  multivitamin 1 Tablet(s) Oral daily    MEDICATIONS  (PRN):  aluminum hydroxide/magnesium hydroxide/simethicone Suspension 30 milliLiter(s) Oral every 4 hours PRN Dyspepsia  haloperidol     Tablet 5 milliGRAM(s) Oral every 4 hours PRN agitation  ibuprofen  Tablet. 400 milliGRAM(s) Oral every 4 hours PRN Moderate Pain (4 - 6)  LORazepam     Tablet 2 milliGRAM(s) Oral every 8 hours PRN Agitation  magnesium hydroxide Suspension 30 milliLiter(s) Oral daily PRN Constipation  melatonin 5 milliGRAM(s) Oral at bedtime PRN Sleep

## 2022-06-26 NOTE — BH INPATIENT PSYCHIATRY PROGRESS NOTE - NSBHATTESTTYPEVISIT_PSY_A_CORE
On-site Attending supervising GULSHAN (99XXX codes)
On-site Attending supervising GULSHAN (99XXX codes)
Attending Only

## 2022-06-26 NOTE — BH INPATIENT PSYCHIATRY PROGRESS NOTE - NSBHCHARTREVIEWVS_PSY_A_CORE FT
Vital Signs Last 24 Hrs  T(C): 36.7 (06-26-22 @ 06:00), Max: 37.1 (06-25-22 @ 17:00)  T(F): 98 (06-26-22 @ 06:00), Max: 98.8 (06-25-22 @ 17:00)  HR: 69 (06-26-22 @ 06:00) (69 - 101)  BP: 113/74 (06-26-22 @ 06:00) (113/74 - 123/82)  BP(mean): --  RR: 17 (06-26-22 @ 06:00) (17 - 18)  SpO2: 99% (06-26-22 @ 06:00) (97% - 99%)     Vital Signs Last 24 Hrs  T(C): 36.7 (06-26-22 @ 09:00), Max: 37.1 (06-25-22 @ 17:00)  T(F): 98 (06-26-22 @ 09:00), Max: 98.8 (06-25-22 @ 17:00)  HR: 62 (06-26-22 @ 09:00) (62 - 101)  BP: 133/88 (06-26-22 @ 09:00) (113/74 - 133/88)  BP(mean): --  RR: 18 (06-26-22 @ 09:00) (17 - 18)  SpO2: 98% (06-26-22 @ 09:00) (98% - 99%)

## 2022-06-27 VITALS
SYSTOLIC BLOOD PRESSURE: 124 MMHG | HEART RATE: 78 BPM | RESPIRATION RATE: 18 BRPM | OXYGEN SATURATION: 100 % | DIASTOLIC BLOOD PRESSURE: 85 MMHG | TEMPERATURE: 99 F

## 2022-06-27 PROCEDURE — 87389 HIV-1 AG W/HIV-1&-2 AB AG IA: CPT

## 2022-06-27 PROCEDURE — 86708 HEPATITIS A ANTIBODY: CPT

## 2022-06-27 PROCEDURE — 99238 HOSP IP/OBS DSCHRG MGMT 30/<: CPT

## 2022-06-27 PROCEDURE — 36415 COLL VENOUS BLD VENIPUNCTURE: CPT

## 2022-06-27 PROCEDURE — 86780 TREPONEMA PALLIDUM: CPT

## 2022-06-27 PROCEDURE — 86803 HEPATITIS C AB TEST: CPT

## 2022-06-27 PROCEDURE — 83036 HEMOGLOBIN GLYCOSYLATED A1C: CPT

## 2022-06-27 PROCEDURE — 86706 HEP B SURFACE ANTIBODY: CPT

## 2022-06-27 PROCEDURE — 80061 LIPID PANEL: CPT

## 2022-06-27 PROCEDURE — U0005: CPT

## 2022-06-27 PROCEDURE — 86704 HEP B CORE ANTIBODY TOTAL: CPT

## 2022-06-27 PROCEDURE — U0003: CPT

## 2022-06-27 PROCEDURE — 87340 HEPATITIS B SURFACE AG IA: CPT

## 2022-06-27 RX ORDER — EMTRICITABINE AND TENOFOVIR DISOPROXIL FUMARATE 200; 300 MG/1; MG/1
1 TABLET, FILM COATED ORAL
Qty: 0 | Refills: 0 | DISCHARGE

## 2022-06-27 RX ORDER — BUPROPION HYDROCHLORIDE 150 MG/1
1 TABLET, EXTENDED RELEASE ORAL
Qty: 30 | Refills: 0
Start: 2022-06-27 | End: 2022-07-26

## 2022-06-27 RX ADMIN — Medication 1 TABLET(S): at 09:32

## 2022-06-27 RX ADMIN — BUPROPION HYDROCHLORIDE 150 MILLIGRAM(S): 150 TABLET, EXTENDED RELEASE ORAL at 09:31
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
## 2022-06-27 NOTE — BH INPATIENT PSYCHIATRY DISCHARGE NOTE - NSBHDCRISKMITIGATE_PSY_ALL_CORE
Safety planning/Referral to case management/Medications targeting suicidality/non-suicidal self injurious behavior/Other

## 2022-06-27 NOTE — BH DISCHARGE NOTE NURSING/SOCIAL WORK/PSYCH REHAB - NSDCPEEMAIL_GEN_ALL_CORE
[facility] for Tobacco Control email [email or web address]

## 2022-06-27 NOTE — BH DISCHARGE NOTE NURSING/SOCIAL WORK/PSYCH REHAB - PATIENT PORTAL LINK FT
You can access the FollowMyHealth Patient Portal offered by [facility] by registering at the following website: [email or web address]. By joining [facility]’s FollowMyHealth portal, you will also be able to view your health information using other applications (apps) compatible with our system.

## 2022-06-27 NOTE — BH DISCHARGE NOTE NURSING/SOCIAL WORK/PSYCH REHAB - NSBHDCAGENCY1FT_PSY_A_CORE
[facility]Meth[facility] / [facility] (Methamphetamine) / [facility]

## 2022-06-27 NOTE — BH INPATIENT PSYCHIATRY DISCHARGE NOTE - DESCRIPTION
The patient was born and raised in [place], [place] by his biological mother and father and older sister.  The patient's sister is 5 years his senior and currently resides in [place], [place].  The patient is domiciled in a private [facility] apartment.  The patient graduated from the [employer] in 2014.  He is currently an  for [employer].  He denies a history of  service.  He identifies as a cisgender male and is homosexual.  The patient states that he is half [place]-American.  He was raised Buddhist but has been considering Atheism.

## 2022-06-27 NOTE — BH INPATIENT PSYCHIATRY DISCHARGE NOTE - ATTENDING DISCHARGE PHYSICAL EXAMINATION:
;;06/27: Alert; oriented; cognition intact; speech clear; no tremor or evidence of movement impairment. Not endorsing  suicidal or homicidal ideation intent or plans; no mention of auditory or visual hallucinations Sleep  appetite ok; no pain issues. Future oriented; looks forward to reading rehab [place]; mood is good.  Thinking is congruent with affect; no pecularities of thinking or language use. Fair to good eye contact; speech clear spontaneous and normal volume i&j fair to poor : aware of medications and acknowledges symptoms but not reflective in a meaningful way  on issues that impact on symptoms.
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
## 2022-06-27 NOTE — BH DISCHARGE NOTE NURSING/SOCIAL WORK/PSYCH REHAB - NSDCPRGOAL_PSY_ALL_CORE
Over the course of treatment, pt. was consistently present in groups of mixed disciplines; pt. was socially active and frequently visible on the unit. Pt. demonstrated clear motivation to continue treatment, relocate near family, and remain sober; pt. described both short and long term goals and hobbies he would like to revisit. Pt was able to identify specific circumstances and environmental stressors that led to a period of drug abuse and suicide attempts; pt. was also able to articulate what coping strategies would serve best for mental wellness.

## 2022-06-27 NOTE — BH DISCHARGE NOTE NURSING/SOCIAL WORK/PSYCH REHAB - NSDCADDINFO2FT_PSY_ALL_CORE
You are scheduled to attend a VIRTUAL appointment on WEDNESDAY, JUNE 29, 2022 at 3:30PM. Your therapist will send you a link to this virtual appointment via email. If you have any questions, you can call your therapist at: [phone].  You are scheduled to attend a VIRTUAL appointment on WEDNESDAY, JUNE 29, 2022 at 3:30PM. Your therapist will send you a link to this virtual appointment via email. If you have any questions, you can call your therapist at: [phone].

## 2022-06-27 NOTE — BH DISCHARGE NOTE NURSING/SOCIAL WORK/PSYCH REHAB - NSDCADDINFO1FT_PSY_ALL_CORE
You are scheduled to attend an IN-PERSON appointment at the [facility] through [facility] on 6/30/22 at 1:15PM. If you have any questions about this appointment, please call the clinic at [phone].  You are scheduled to attend a VIRTUAL appointment at the [facility] through [facility] on 6/30/22 at 1:15PM. The clinic will call you via phone for this appointment. If you have any questions about this appointment, please call the clinic at [phone].

## 2022-06-27 NOTE — BH DISCHARGE NOTE NURSING/SOCIAL WORK/PSYCH REHAB - NSCDUDCCRISIS_PSY_A_CORE
[facility] Well  1 ([phone]) [facility]-WELL ([phone])  Text "WELL" to 85898  Website: www.[employer] Mobile.Bantr/.National Suicide Prevention Lifeline 1 (626) 960-2308/.  [facility]  1 (447) LIF[facility]ET (269-4914)/.  [facility]  [address] [place] [address] [phone]   Hours:  Monday through Friday from 9 AM to 3 PM [facility] Well  1 (252) [facility]-WELL ([phone])  Text "WELL" to 13152  Website: www.Bib + Tuck/.Safe Horizons 1 (392) 101-UZTV (9126) Website: www.safehorizon.org/.National Suicide Prevention Lifeline 0 (310) 969-8011/.  [facility]  1 (686) LIFENET (539-3151)/.  [facility]’s Behavioral Health Crisis Center  [address] [place] [address] [phone]   Hours:  Monday through Friday from 9 AM to 3 PM/.  [facility] of  Affairs - Veterans Crisis Line  7 [phone]) 884-2868, Option 1 [facility] Well  1 (439) [facility]-WELL ([phone])  Text "WELL" to 11248  Website: www.[employer] [facility]/.National Suicide Prevention Lifeline 1 (475) 930-1282/.  [facility]  1 (462) LIFENET (096-8040)/.  [facility] Behavior[facility] Crisis [facility]  [address] [place] [address] [phone]   Hours:  Monday through Friday from 9 AM to 3 PM [facility] Well  1 (100) [facility]-WELL ([phone])  Text "WELL" to 81319  Website: www.FamilyApp/.Safe Horizons 1 (025) 171-TTVK (5037) Website: www.safehorizon.org/.National Suicide Prevention Lifeline 5 (548) 444-8220/.  [facility]  1 (903) LIFENET (654-1662)/.  [facility]’s Behavioral Health Crisis Center  [address] [place] [address] [phone]   Hours:  Monday through Friday from 9 AM to 3 PM/.  U.S. Dept of  Affairs - Veterans Crisis Line  4 [phone], Option 1

## 2022-06-27 NOTE — BH DISCHARGE NOTE NURSING/SOCIAL WORK/PSYCH REHAB - NSBHDCAGENCY3FT_PSY_A_CORE
[facility] / Razia Arredondo PA-C in Psychiatry

## 2022-06-27 NOTE — BH INPATIENT PSYCHIATRY DISCHARGE NOTE - REASON FOR ADMISSION
impulsive suicide attempt via intentional overdose on Zoloft (45 tablets), Wellbutrin (7 tablets) and acetaminophen (10-20 tablets) in the setting of substance use (cocaine, methamphetamines),

## 2022-06-27 NOTE — BH DISCHARGE NOTE NURSING/SOCIAL WORK/PSYCH REHAB - NSDCADDINFO3FT_PSY_ALL_CORE
You are scheduled to attend a VIRTUAL appointment with [facility] psychiatric Physician's Assistant Razia Arredondo on TUESDAY, JUNE 28, 2022 at 9:00AM. If you have any questions about this appointment, please call the clinic at: [phone].  You are scheduled to attend a VIRTUAL appointment with [facility] psychiatric Physician's Assistant Razia Arredondo on TUESDAY, JUNE 28, 2022 at 9:00AM. The clinic will send you an email & text appointment notification. If you have any questions about this appointment, please call the clinic at: [phone].  You are scheduled to attend a VIRTUAL zoom appointment with [facility] psychiatric Physician's Assistant Razia Arredondo on TUESDAY, JUNE 28, 2022 at 9:00AM. The clinic will send you an email & text appointment notification. If you have any questions about this appointment, please call the clinic at: [phone].

## 2022-06-27 NOTE — BH DISCHARGE NOTE NURSING/SOCIAL WORK/PSYCH REHAB - NSDCPEWEB_GEN_ALL_CORE
[facility] for Tobacco Control website --- [email or web address] website --- [email or web address]

## 2022-06-27 NOTE — BH INPATIENT PSYCHIATRY DISCHARGE NOTE - NSBHMETABOLIC_PSY_ALL_CORE_FT
BMI: BMI (kg/m2): 22.4 (06-18-22 @ 18:35)  HbA1c: A1C with Estimated Average Glucose Result: 4.3 % (06-22-22 @ 08:32)    Glucose:   BP: 122/78 (06-26-22 @ 21:00) (109/70 - 133/88)  Lipid Panel: Date/Time: 06-22-22 @ 08:32  Cholesterol, Serum: 155  Direct LDL: --  HDL Cholesterol, Serum: 41  Total Cholesterol/HDL Ration Measurement: --  Triglycerides, Serum: 125
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
## 2022-06-27 NOTE — BH INPATIENT PSYCHIATRY DISCHARGE NOTE - NSBHDCHANDOFFNOFT_PSY_A_CORE
Discharge summary to be sent to next provider with contact information needed for handoff.  MSW arranged aftercare and gave pertinent information.  Discharge summary to be sent to next provider with contact information needed for any further handoff.

## 2022-07-01 DIAGNOSIS — F14.90 COCAINE USE, UNSPECIFIED, UNCOMPLICATED: ICD-10-CM

## 2022-07-01 DIAGNOSIS — T43.502A POISONING BY UNSPECIFIED ANTIPSYCHOTICS AND NEUROLEPTICS, INTENTIONAL SELF-HARM, INITIAL ENCOUNTER: ICD-10-CM

## 2022-07-01 DIAGNOSIS — F12.90 CANNABIS USE, UNSPECIFIED, UNCOMPLICATED: ICD-10-CM

## 2022-07-01 DIAGNOSIS — Z72.89 OTHER PROBLEMS RELATED TO LIFESTYLE: ICD-10-CM

## 2022-07-01 DIAGNOSIS — R63.6 UNDERWEIGHT: ICD-10-CM

## 2022-07-01 DIAGNOSIS — F32.2 MAJOR DEPRESSIVE DISORDER, SINGLE EPISODE, SEVERE WITHOUT PSYCHOTIC FEATURES: ICD-10-CM

## 2022-07-01 DIAGNOSIS — T39.92XA POISONING BY UNSPECIFIED NONOPIOID ANALGESIC, ANTIPYRETIC AND ANTIRHEUMATIC, INTENTIONAL SELF-HARM, INITIAL ENCOUNTER: ICD-10-CM

## 2022-07-01 DIAGNOSIS — D64.9 ANEMIA, UNSPECIFIED: ICD-10-CM

## 2022-07-01 DIAGNOSIS — F41.1 GENERALIZED ANXIETY DISORDER: ICD-10-CM

## 2022-09-16 NOTE — PATIENT PROFILE ADULT - NSPRESCRALCAMT_GEN_A_NUR
Dear Carmela Park,  Thank you for visiting the [facility] today for your visit, I appreciate your time. I hope that we have addressed any concerns you may have had.  Sincerely,  Dr. Byron Abdalla D.O., F.A.C.C., F.A.C.O.I.    Below are the tests/labs ordered along with any new medications / refills:  Orders Placed This Encounter    Lipid Panel With Reflex    Glycohemoglobin       If you have never been screened for heart disease or vascular disease, we have many easy and quick ways to do this. Please ask me how!    We always advise our patients to follow a well balanced diet, specifically a mediterranean style diet focusing on fruits, vegetables, extra virgin olive oil and fish. We suggest avoiding foods high in trans saturated fats, red meats, eggs, processed foods or those high in sugar content.    We also recommend aerobic activity up to 30 minutes daily    Healthy eating habits and exercise are critical to us as we work towards achieving your best health.  Ways to lower blood pressure non medically: Weight reduction, DASH diet (low sodium diet), Restricting sodium, increasing potassium intake, increasing physical activity and limiting alcohol  To look for the best blood pressure cuff please search www.[email or web address]  Follow up as scheduled and please call with any questions or concerns that may arise.    HOW TO CHECK YOUR BLOOD PRESSURE:    Listed below are a copy of some recent blood work                   Uncontrolled High Blood Pressure (Established)    Your blood pressure was unusually high today. This can occur if you’ve missed doses of your blood pressure medicine. Or it can happen if you are taking other medicines. These include some asthma inhalers, decongestants, diet pills, and street drugs like cocaine and amphetamine.  Other causes include:  Weight gain  More salt in your diet  Smoking  Caffeine  Your blood pressure can also rise if you are emotionally upset or in intense pain.  It may go back to normal after a period of rest.  Blood pressure measurements are given as 2 numbers. Systolic blood pressure is the upper number. This is the pressure when the heart contracts. Diastolic blood pressure is the lower number. This is the pressure when the heart relaxes between beats. You will see your blood pressure readings written together. For example, a person with a systolic pressure of 118 and a diastolic pressure of 78 will have 118/78 written in the medical record. To be high blood pressure, the numbers must be higher when tested over a period of time.  Blood pressure is categorized as normal, elevated, or stage 1 or stage 2 high blood pressure:  Normal blood pressure is systolic of less than 120 and diastolic of less than 80 (120/80)  Elevated blood pressure is systolic of 120 to 129 and diastolic less than 80  Stage 1 high blood pressure is systolic is 130 to 139 or diastolic between 80 to 89  Stage 2 high blood pressure is when systolic is 140 or higher or the diastolic is 90 or higher  Uncontrolled high blood pressure can cause serious health problems. It raises your risk for heart attack, stroke, and heart failure. In general, if you have high blood pressure, keeping your blood pressure below 130/80 mmHg may help prevent these problems. Your healthcare provider may prescribe medicine to help control blood pressure if lifestyle changes are not enough.  Home care  It’s important to take steps to lower your blood pressure. If you are taking blood pressure medicine, the guidelines below may help you need less or no medicines in the future.  Start a weight-loss program if you are overweight.  Cut back on the amount of salt in your diet:  Avoid high-salt foods like olives, pickles, smoked meats, and salted potato chips.  Don’t add salt to your food at the table.  Use only small amounts of salt when cooking.  Start an exercise program. Talk with your healthcare provider about what exercise  program is best for you. It doesn’t have to be difficult. Even brisk walking for 20 minutes 3 times a week is a good form of exercise.  Avoid medicines that stimulates the heart. This includes many over-the-counter cold and sinus decongestant pills and sprays, as well as diet pills. Check the warnings about high blood pressure on the label. Before purchasing any over-the-counter medicines or supplements, always ask the pharmacist about the product's potential interaction with your high blood pressure and your medicines.  Stimulants such as amphetamine or cocaine could be lethal for someone with high blood pressure. Never take these.  Limit how much caffeine you drink. Or switch to noncaffeinated beverages.  Stop smoking. If you are a long-time smoker, this can be hard. Enroll in a stop-smoking program to make it more likely that you will succeed. Talk with your provider about ways to quit.  Learn how to handle stress better. This is an important part of any program to lower blood pressure. Learn ways to relax. These include meditation, yoga, and biofeedback.  If medicines were prescribed, take them exactly as directed. Missing doses may cause your blood pressure to get out of control.  If you miss a dose or doses of your medicines, check with your healthcare provider or pharmacist about what to do.  Consider buying an automatic blood pressure machine. Your provider may recommend a certain type. You can get one of these at most pharmacies. Measure your blood pressure twice a day, in the morning, and in the late afternoon. Keep a written record of your home blood pressure readings and take the record to your medical appointments.  Here are some additional guidelines on home blood pressure monitoring from the American [employer] Association.  Don't smoke or drink coffee for 30 minutes  Go to the bathroom before the test.  Relax for 5 minutes before taking the measurement.  Sit correctly. Be sure your back is supported.  Don't sit on a couch or soft chair. Uncross your feet and place them flat on the floor. Place your arm on a solid, flat surface like a table with the upper arm at heart level. Make certain the middle of the cuff is directly above the bend of the elbow. Check the monitor's instruction manual for an illustration.  Take multiple readings. When you measure, take 2 or 3 readings one minute apart and record all of the results.  Take your blood pressure at the same time every day, or as your healthcare provider recommends.  Record the date, time, and blood pressure reading.  Take the record with you to your next appointment. If your blood pressure monitor has a built-in memory, simply take the monitor with you to your next appointment.  Call your provider if you have several high readings. Don't be frightened by a single high reading, but if you get several high readings, check in with your healthcare provider.  Note: When blood pressure reaches a systolic (top number) of 180 or higher or a diastolic (bottom number) of 110 or higher, emergency medical treatment is required. Call your healthcare provider immediately.  Follow-up care  Regular visits to your own healthcare provider for blood pressure and medicine checks are an important part of your care. Make a follow-up appointment as directed. Bring the record of your home blood pressure readings to the appointment.  When to seek medical advice  Call your healthcare provider right away if any of these occur:  Blood pressure reaches a systolic (top number) of 180 or higher or diastolic (bottom number) of 110 or higher, emergency medical treatment is required.  Chest, arm, shoulder, neck, or upper back pain  Shortness of breath  Severe headache  Throbbing or rushing sound in the ears  Nosebleed  Extreme drowsiness, confusion, or fainting  Dizziness or dizziness with spinning sensation (vertigo)  Weakness in an arm or leg or on one side of the face  Trouble speaking or seeing    Date Last Reviewed: 1/1/2017  © 3931-5708 [employer]. [address] [place] [address], [place] [address]. All rights reserved. This information is not intended as a substitute for professional medical care. Always follow your healthcare professional's instructions.          Discharge Instructions: Taking Your Blood Pressure  Blood pressure is the force of blood as it moves from the heart through the blood vessels. You can take your own blood pressure reading using a digital monitor. Take readings as often as your health care provider instructs. Take your readings each time in the same way, using the same arm. Here are guidelines for taking your blood pressure.  1. Relax    Wait at least a half hour after smoking, eating, or exercising. Do not drink coffee, tea, soda, or other caffeinated beverages before checking your blood pressure.   Sit comfortably at a table. Place the monitor near you.  Rest for a few minutes before you begin.  2. Wrap the cuff    Place your arm on the table, palm up. Put your arm in a position that is level with your heart. Wrap the cuff around your upper arm, just above your elbow. It’s best to wrap the cuff on bare skin, not over clothing.  Make sure your cuff fits. If it doesn’t wrap around your upper arm, order a larger cuff. A cuff that is too large or too small can result in an inaccurate blood pressure reading.  3. Inflate the cuff    Pump the cuff until the scale reads 200. If you have a self-inflating cuff, push the button that starts the pump.  The cuff will tighten, then loosen.  The numbers will change. When they stop changing, your blood pressure reading will appear.  If you get a reading that is too high or too low for you, relax for a few minutes. Then do the test again.    4. Write down the results  Write down your blood pressure numbers. José Antonio the date and time. Keep your results in one place, such as a notebook.  Remove the cuff from your arm. Turn off the machine.      © 6341-6567 [employer]. [address] [place] [address], [place] [address]. All rights reserved. This information is not intended as a substitute for professional medical care. Always follow your healthcare professional's instructions.            10 or more

## 2022-10-16 NOTE — H&P ADULT - NSHPLABSRESULTS_GEN_ALL_CORE
none .  LABS:                         12.6   6.89  )-----------( 278      ( 18 Jun 2022 18:52 )             36.7     06-18    141  |  106  |  12  ----------------------------<  102<H>  3.6   |  21<L>  |  0.79    Ca    9.5      18 Jun 2022 18:52  Mg     2.0     06-18    TPro  7.0  /  Alb  4.4  /  TBili  0.7  /  DBili  x   /  AST  43<H>  /  ALT  28  /  AlkPhos  55  06-18    PT/INR - ( 18 Jun 2022 18:52 )   PT: 11.6 sec;   INR: 0.98          PTT - ( 18 Jun 2022 18:52 )  PTT:28.7 sec      Lactate, Blood: 0.7 mmol/L (06-18 @ 18:52)      RADIOLOGY, EKG & ADDITIONAL TESTS: Reviewed.

## 2023-01-06 NOTE — BH SOCIAL WORK INITIAL PSYCHOSOCIAL EVALUATION - NSBHTREATHXTYPE2_PSY_ALL_CORE
Patient informed he was added to wait list if an appointment happens to become available he will be contacted.   
Psychiatry

## 2023-01-30 NOTE — BH INPATIENT PSYCHIATRY ASSESSMENT NOTE - SUICIDE ATTEMPT:
Yes past 3 months CONSTITUTIONAL: Well-appearing; in no apparent distress.   HEAD: Normocephalic; atraumatic.   ENT: Hearing is intact with good acuity to spoken voice.  Patient is speaking clearly, not muffled and airway is intact.   RESPIRATORY: No signs of respiratory distress. Lung sounds are clear in all lobes bilaterally without rales, rhonchi, or wheezes.  CARDIOVASCULAR: Regular rate and rhythm.   GI: tender to palpation in the L pelvic area. Abdomen is soft, non-tender, and without distention. Bowel sounds are present and normoactive in all four quadrants. No masses are noted.   BACK: No evidence of trauma or deformity. No CVA tenderness bilaterally. Normal ROM.   NEURO: A & O x 3. Normal speech. No focal deficit.  PSYCHOLOGICAL: Appropriate mood and affect. Good judgement and insight.
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
